# Patient Record
Sex: FEMALE | Race: ASIAN | NOT HISPANIC OR LATINO | Employment: FULL TIME | URBAN - METROPOLITAN AREA
[De-identification: names, ages, dates, MRNs, and addresses within clinical notes are randomized per-mention and may not be internally consistent; named-entity substitution may affect disease eponyms.]

---

## 2024-05-29 ENCOUNTER — OCCUPATIONAL HEALTH (OUTPATIENT)
Dept: URGENT CARE | Facility: CLINIC | Age: 69
End: 2024-05-29
Payer: COMMERCIAL

## 2024-05-29 ENCOUNTER — OFFICE VISIT (OUTPATIENT)
Dept: URGENT CARE | Facility: CLINIC | Age: 69
End: 2024-05-29
Payer: COMMERCIAL

## 2024-05-29 VITALS
DIASTOLIC BLOOD PRESSURE: 74 MMHG | HEIGHT: 64 IN | SYSTOLIC BLOOD PRESSURE: 116 MMHG | WEIGHT: 110 LBS | RESPIRATION RATE: 18 BRPM | OXYGEN SATURATION: 97 % | HEART RATE: 71 BPM | BODY MASS INDEX: 18.78 KG/M2

## 2024-05-29 DIAGNOSIS — Z02.83 ENCOUNTER FOR DRUG SCREENING: Primary | ICD-10-CM

## 2024-05-29 DIAGNOSIS — Z02.6 ENCOUNTER RELATED TO WORKER'S COMPENSATION CLAIM: ICD-10-CM

## 2024-05-29 DIAGNOSIS — S50.02XA CONTUSION OF LEFT ELBOW, INITIAL ENCOUNTER: Primary | ICD-10-CM

## 2024-05-29 DIAGNOSIS — S40.012A CONTUSION OF LEFT SHOULDER, INITIAL ENCOUNTER: ICD-10-CM

## 2024-05-29 LAB
CTP QC/QA: YES
POC 10 PANEL DRUG SCREEN: NEGATIVE

## 2024-05-29 PROCEDURE — 73030 X-RAY EXAM OF SHOULDER: CPT | Mod: LT,S$GLB,, | Performed by: RADIOLOGY

## 2024-05-29 PROCEDURE — 99204 OFFICE O/P NEW MOD 45 MIN: CPT | Mod: S$GLB,,, | Performed by: PHYSICIAN ASSISTANT

## 2024-05-29 PROCEDURE — 73070 X-RAY EXAM OF ELBOW: CPT | Mod: LT,S$GLB,, | Performed by: RADIOLOGY

## 2024-05-29 PROCEDURE — 80305 DRUG TEST PRSMV DIR OPT OBS: CPT | Mod: S$GLB,,, | Performed by: PHYSICIAN ASSISTANT

## 2024-05-29 RX ORDER — AMLODIPINE BESYLATE 5 MG/1
1 TABLET ORAL EVERY MORNING
COMMUNITY
Start: 2023-08-04 | End: 2024-06-07 | Stop reason: SDUPTHER

## 2024-05-29 RX ORDER — METFORMIN HYDROCHLORIDE 1000 MG/1
1000 TABLET ORAL
COMMUNITY
Start: 2024-04-22 | End: 2024-06-07 | Stop reason: SDUPTHER

## 2024-05-29 RX ORDER — FENOFIBRATE 67 MG/1
CAPSULE ORAL
COMMUNITY
Start: 2023-08-04 | End: 2024-06-07 | Stop reason: SDUPTHER

## 2024-05-29 RX ORDER — DAPAGLIFLOZIN 10 MG/1
10 TABLET, FILM COATED ORAL DAILY
COMMUNITY
Start: 2023-08-04 | End: 2024-06-07 | Stop reason: SDUPTHER

## 2024-05-29 RX ORDER — PAROXETINE 10 MG/1
10 TABLET, FILM COATED ORAL
COMMUNITY
Start: 2023-08-04 | End: 2024-06-07 | Stop reason: SDUPTHER

## 2024-05-29 RX ORDER — ATORVASTATIN CALCIUM 10 MG/1
1 TABLET, FILM COATED ORAL NIGHTLY
COMMUNITY
Start: 2023-08-04 | End: 2024-06-07 | Stop reason: SDUPTHER

## 2024-05-29 RX ORDER — LOSARTAN POTASSIUM 50 MG/1
1 TABLET ORAL DAILY
COMMUNITY
Start: 2023-08-04 | End: 2024-06-07 | Stop reason: SDUPTHER

## 2024-05-29 NOTE — PROGRESS NOTES
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    Chief Complaint: Arm Injury     Chandrakant presents for evaluation of left arm injuries, DOI 5/28/24.  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  She reports she was at her desk yesterday and was reaching for something and the chair tipped, causing her to fall on her left arm.  She is having left elbow and left shoulder pain.  She also has swelling and bruising of the elbow.  The pain is worse with lifting her arm & bending the elbow.  She has taken tylenol & ibuprofen with relief.    See MA note below.  Patient's place of employment - [employer]  Patient's job title - MD  Date of injury - 5/28/2024  Body part injured including left or right - LT ARM  Injury Mechanism -   What they were doing when they got hurt - Sitting in chair reaching for something and the chair tipped over   What they did immediately after - took Tylenol and continued to work .  Pain scale right now - 7/10    KM      Constitution: Negative.   HENT: Negative.     Neck: neck negative.   Cardiovascular: Negative.    Respiratory: Negative.     Musculoskeletal:  Positive for pain, joint pain, joint swelling, abnormal ROM of joint and muscle ache. Negative for trauma.   Skin: Negative.  Positive for bruising.   Neurological:  Negative for numbness and tingling.     Objective:     Physical Exam  Vitals and nursing note reviewed.   Constitutional:       General: She is not in acute distress.     Appearance: Normal appearance. She is not ill-appearing.   HENT:      Head: Normocephalic and atraumatic.      Right Ear: Tympanic membrane, ear canal and external ear normal. There is no impacted cerumen.      Left Ear: Tympanic membrane, ear canal and external ear normal. There is no impacted cerumen.      Nose: Nose normal. No congestion or rhinorrhea.      Mouth/Throat:      Mouth: Mucous membranes are moist.      Pharynx: No oropharyngeal exudate or posterior oropharyngeal erythema.   Eyes:       Extraocular Movements: Extraocular movements intact.      Conjunctiva/sclera: Conjunctivae normal.      Pupils: Pupils are equal, round, and reactive to light.   Cardiovascular:      Rate and Rhythm: Normal rate and regular rhythm.      Pulses: Normal pulses.      Heart sounds: Normal heart sounds.   Pulmonary:      Effort: Pulmonary effort is normal.      Breath sounds: Normal breath sounds.   Abdominal:      General: Bowel sounds are normal.      Palpations: Abdomen is soft.   Musculoskeletal:      Left shoulder: Tenderness and bony tenderness present. No swelling, deformity, effusion, laceration or crepitus. Normal range of motion. Normal strength. Normal pulse.      Left elbow: Swelling present. No deformity, effusion or lacerations. Decreased range of motion. Tenderness present in medial epicondyle and lateral epicondyle. No radial head or olecranon process tenderness.        Arms:       Cervical back: Normal range of motion.      Comments: L shoulder with anterior shoulder TTP.  There is FROM of the shoulder.  Pain with abduction and overhead reaching.  Ulices's test neg, drop arm test neg.  No weakness with IR or ER, but does cause pain in the elbow.    L elbow with mild swelling & ecchymosis to lateral elbow.  There is TTP lateral epicondyle > medial epicondyle.    Full flexion, slight decrease in extension.     Skin:     General: Skin is warm.      Capillary Refill: Capillary refill takes less than 2 seconds.   Neurological:      General: No focal deficit present.      Mental Status: She is alert and oriented to person, place, and time.   Psychiatric:         Mood and Affect: Mood normal.         Behavior: Behavior normal.         Thought Content: Thought content normal.         Judgment: Judgment normal.        XR L elbow - no acute fracture or abnormality, radiology read pending.    XR L shoulder - no acute fracture or abnormality, radiology read pending.    Assessment:      1. Contusion of left elbow,  initial encounter    2. Contusion of left shoulder, initial encounter    3. Encounter related to worker's compensation claim      Plan:     There were no acute findings on my interpretation of the x-rays and I discussed with this with the patient.  The radiologist's interpretation was pending at the completion of the visit and I informed the patient that they will be notified if the interpretation differs. Patient would prefer OTC tylenol & ibuprofen.  Advised ice, then heat & gentle ROM.  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  She will follow up in 1 week.     Patient Instructions: Apply ice 24-48 hours then apply heat/warm soaks   Restrictions: Regular Duty  Follow up in about 1 week (around 6/5/2024).

## 2024-05-29 NOTE — LETTER
[facility]  [address] [address]  Phone: [phone]  Fax: [phone]  [employer] [facility]: [email or web address]    Pt Name: Ciera Stallings MD  Injury Date: 05/28/2024   Employee ID: [id number] Date of Treatment: 05/29/2024   Company: [employer]      Appointment Time:  Arrived: 10:40 AM    Provider: Rachna Rueda PA-C Time Out:12:46 PM      Office Treatment:   1. Contusion of left elbow, initial encounter    2. Contusion of left shoulder, initial encounter    3. Encounter related to worker's compensation claim          Patient Instructions: Apply ice 24-48 hours then apply heat/warm soaks      Restrictions: Regular Duty     Return Appointment: 6/5/2024 at 9:00 AM [facility]

## 2024-06-05 ENCOUNTER — OFFICE VISIT (OUTPATIENT)
Dept: URGENT CARE | Facility: CLINIC | Age: 69
End: 2024-06-05
Payer: COMMERCIAL

## 2024-06-05 VITALS
WEIGHT: 110 LBS | RESPIRATION RATE: 17 BRPM | HEART RATE: 74 BPM | DIASTOLIC BLOOD PRESSURE: 83 MMHG | HEIGHT: 64 IN | OXYGEN SATURATION: 98 % | SYSTOLIC BLOOD PRESSURE: 123 MMHG | BODY MASS INDEX: 18.78 KG/M2

## 2024-06-05 DIAGNOSIS — Z02.6 ENCOUNTER RELATED TO WORKER'S COMPENSATION CLAIM: ICD-10-CM

## 2024-06-05 DIAGNOSIS — S40.012D CONTUSION OF LEFT SHOULDER, SUBSEQUENT ENCOUNTER: ICD-10-CM

## 2024-06-05 DIAGNOSIS — S50.02XD CONTUSION OF LEFT ELBOW, SUBSEQUENT ENCOUNTER: Primary | ICD-10-CM

## 2024-06-05 PROCEDURE — 99213 OFFICE O/P EST LOW 20 MIN: CPT | Mod: S$GLB,,, | Performed by: PHYSICIAN ASSISTANT

## 2024-06-05 NOTE — PROGRESS NOTES
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    Chief Complaint: Arm Pain     Chandrakant presents for follow up of left arm injuries, DOI 5/28/24.  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  Her ROM is better.  She is still sore to the touch at the elbow and having pain with lifting & ROM of the shoulder.  She denies any radiating pain or paresthesias in the arm.  She is not taking any medication at this time, but is doing massage at night.    See MA note below.  Patient's place of employment - [employer]  Patient's job title - MD  Date of Injury - 05/28/2024  Body part injured - Left Arm  Current work status per last visit - Regular   Improved, same, or worse - Same, but improving gradually   Pain Scale right now (1-10) -  5/10  SB.     Pt states that she has been having a radiating pain that starts at the neck and radiates down Left arm to the elbow.         Constitution: Negative.   HENT: Negative.     Neck: Negative for neck pain and neck stiffness.   Cardiovascular: Negative.    Respiratory: Negative.     Musculoskeletal:  Positive for pain, joint pain, joint swelling, abnormal ROM of joint and muscle ache. Negative for trauma.   Skin: Negative.  Negative for bruising.   Neurological:  Negative for numbness and tingling.     Objective:     Physical Exam  Vitals and nursing note reviewed.   Constitutional:       General: She is not in acute distress.     Appearance: Normal appearance. She is not ill-appearing.   HENT:      Head: Normocephalic and atraumatic.      Right Ear: Tympanic membrane, ear canal and external ear normal. There is no impacted cerumen.      Left Ear: Tympanic membrane, ear canal and external ear normal. There is no impacted cerumen.      Nose: Nose normal. No congestion or rhinorrhea.      Mouth/Throat:      Mouth: Mucous membranes are moist.      Pharynx: No oropharyngeal exudate or posterior oropharyngeal erythema.   Eyes:       Extraocular Movements: Extraocular movements intact.      Conjunctiva/sclera: Conjunctivae normal.      Pupils: Pupils are equal, round, and reactive to light.   Cardiovascular:      Rate and Rhythm: Normal rate and regular rhythm.      Pulses: Normal pulses.      Heart sounds: Normal heart sounds.   Pulmonary:      Effort: Pulmonary effort is normal.      Breath sounds: Normal breath sounds.   Abdominal:      General: Bowel sounds are normal.      Palpations: Abdomen is soft.   Musculoskeletal:      Left shoulder: Tenderness and bony tenderness present. No swelling, deformity, effusion, laceration or crepitus. Normal range of motion. Normal strength. Normal pulse.      Left elbow: No swelling, deformity, effusion or lacerations. Normal range of motion. Tenderness present in medial epicondyle and lateral epicondyle. No radial head or olecranon process tenderness.        Arms:       Cervical back: Normal range of motion.      Comments: L shoulder with anterior shoulder TTP.  There is FROM of the shoulder.  ROM is less painful today.  Ulices's test neg, drop arm test neg.    No weakness with IR or ER.    L elbow with resolved swelling & ecchymosis.   There is TTP lateral epicondyle > medial epicondyle.    Full flexion & extension today.     Skin:     General: Skin is warm.      Capillary Refill: Capillary refill takes less than 2 seconds.   Neurological:      General: No focal deficit present.      Mental Status: She is alert and oriented to person, place, and time.   Psychiatric:         Mood and Affect: Mood normal.         Behavior: Behavior normal.         Thought Content: Thought content normal.         Judgment: Judgment normal.        Assessment:      1. Contusion of left elbow, subsequent encounter    2. Contusion of left shoulder, subsequent encounter    3. 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  She is not interested in any prescription  medications like muscle relaxers at this time.  She will continue at home ROM/massage.  Follow up in 3 weeks and anticipate discharge at that time.     Patient Instructions: Attention not to aggravate affected area   Restrictions: Regular Duty  Follow up in about 20 days (around 6/25/2024).

## 2024-06-05 NOTE — LETTER
[facility]  [address] [address]  Phone: [phone]  Fax: [phone]  [employer] [facility]: [id number]    Pt Name: Ciera Stallings MD  Injury Date: 05/28/2024   Employee ID: [id number] Date of Treatment: 06/05/2024   Company: [employer]      Appointment Time: 09:00 AM Arrived: 8:38 AM    Provider: Rachna Rueda PA-C Time Out:9:30 AM      Office Treatment:   1. Contusion of left elbow, subsequent encounter    2. Contusion of left shoulder, subsequent encounter    3. Encounter related to worker's compensation claim          Patient Instructions: Attention not to aggravate affected area      Restrictions: Regular Duty     Return Appointment: 6/25/2024 at 8:30 AM [facility]

## 2024-06-06 PROBLEM — E03.8 SUBCLINICAL HYPOTHYROIDISM: Status: ACTIVE | Noted: 2023-01-16

## 2024-06-06 PROBLEM — H52.4 MYOPIA WITH ASTIGMATISM AND PRESBYOPIA, BILATERAL: Status: ACTIVE | Noted: 2021-03-25

## 2024-06-06 PROBLEM — I10 BENIGN ESSENTIAL HYPERTENSION: Status: ACTIVE | Noted: 2023-01-16

## 2024-06-06 PROBLEM — H40.013 OPEN ANGLE WITH BORDERLINE FINDINGS AND LOW GLAUCOMA RISK IN BOTH EYES: Status: ACTIVE | Noted: 2021-03-25

## 2024-06-06 PROBLEM — F41.1 GAD (GENERALIZED ANXIETY DISORDER): Status: ACTIVE | Noted: 2023-01-16

## 2024-06-06 PROBLEM — E11.69 HYPERLIPIDEMIA ASSOCIATED WITH TYPE 2 DIABETES MELLITUS: Status: ACTIVE | Noted: 2024-06-06

## 2024-06-06 PROBLEM — H04.123 DRY EYES, BILATERAL: Status: ACTIVE | Noted: 2021-03-25

## 2024-06-06 PROBLEM — E78.5 HYPERLIPIDEMIA ASSOCIATED WITH TYPE 2 DIABETES MELLITUS: Status: ACTIVE | Noted: 2024-06-06

## 2024-06-06 PROBLEM — H52.203 MYOPIA WITH ASTIGMATISM AND PRESBYOPIA, BILATERAL: Status: ACTIVE | Noted: 2021-03-25

## 2024-06-06 PROBLEM — H52.13 MYOPIA WITH ASTIGMATISM AND PRESBYOPIA, BILATERAL: Status: ACTIVE | Noted: 2021-03-25

## 2024-06-06 PROBLEM — H25.13 NUCLEAR SCLEROSIS, BILATERAL: Status: ACTIVE | Noted: 2021-03-25

## 2024-06-06 PROBLEM — H11.152 PINGUECULA OF LEFT EYE: Status: ACTIVE | Noted: 2021-03-25

## 2024-06-06 PROBLEM — F41.9 ANXIETY: Status: ACTIVE | Noted: 2023-01-16

## 2024-06-06 RX ORDER — LORATADINE 10 MG/1
10 TABLET ORAL
COMMUNITY
End: 2024-06-07

## 2024-06-06 RX ORDER — METOPROLOL SUCCINATE 100 MG/1
1 TABLET, EXTENDED RELEASE ORAL DAILY
COMMUNITY
Start: 2023-08-04 | End: 2024-06-07 | Stop reason: SDUPTHER

## 2024-06-06 RX ORDER — DEXTROSE 4 G
TABLET,CHEWABLE ORAL
COMMUNITY
Start: 2024-04-22

## 2024-06-06 NOTE — ASSESSMENT & PLAN NOTE
Lipid panel cholesterol 156, HDL 60, LDL 70, triglycerides 150  Continue current regimen:  - Atorvastatin 10 mg  - Fenofibrate 67 mg

## 2024-06-06 NOTE — PROGRESS NOTES
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 Patient denies any acute concerns today.      PAST MEDICAL HISTORY:  Past Medical History:   Diagnosis Date    Benign essential hypertension 01/16/2023    MARQUITA (generalized anxiety disorder) 01/16/2023    Hyperlipidemia associated with type 2 diabetes mellitus 06/06/2024    Subclinical hypothyroidism 01/16/2023    Type 2 diabetes mellitus without retinopathy 12/29/2013         MEDICATIONS:    Current Outpatient Medications:     blood-glucose meter Misc, use as directed, Disp: , Rfl:     amLODIPine (NORVASC) 5 MG tablet, Take 1 tablet (5 mg total) by mouth every morning., Disp: 90 tablet, Rfl: 3    atorvastatin (LIPITOR) 10 MG tablet, Take 1 tablet (10 mg total) by mouth every evening., Disp: 90 tablet, Rfl: 3    dapagliflozin propanediol (FARXIGA) 10 mg tablet, Take 1 tablet (10 mg total) by mouth once daily., Disp: 90 tablet, Rfl: 3    fenofibrate micronized (LOFIBRA) 67 MG capsule, Take 1 capsule (67 mg total) by mouth before breakfast., Disp: 90 capsule, Rfl: 3    losartan (COZAAR) 50 MG tablet, Take 1 tablet (50 mg total) by mouth once daily., Disp: 90 tablet, Rfl: 3    metFORMIN (GLUCOPHAGE) 1000 MG tablet, Take 1 tablet (1,000 mg total) by mouth 2 (two) times daily with meals., Disp: 180 tablet, Rfl: 3    metoprolol succinate (TOPROL-XL) 100 MG 24 hr tablet, Take 1 tablet (100 mg total) by mouth once daily., Disp: 90 tablet, Rfl: 3    paroxetine (PAXIL) 10 MG tablet, Take 1 tablet (10 mg total) by mouth once daily., Disp: 90 tablet, Rfl: 3        PAST SURGICAL HISTORY:  Past Surgical History:   Procedure Laterality Date    APPENDECTOMY N/A     ELBOW FRACTURE SURGERY Right     HIP FRACTURE SURGERY Left   "      SOCIAL HISTORY:  Social History     Tobacco Use    Smoking status: Never    Smokeless tobacco: Never   Substance Use Topics    Alcohol use: Never    Drug use: Never       ALLERGIES:  Review of patient's allergies indicates:  No Known Allergies      FAMILY HISTORY:  Family History   Problem Relation Name Age of Onset    Diabetes Mother jorge l     Hypertension Mother jorge l     Cancer Father WOLF Montiel     Diabetes Sister majo,     Hearing loss Sister majo,     Diabetes Brother jorge luis, alberto          HEALTH MAINTENANCE:     IMMUNIZATIONS:                                                              Influenza: not indicated  COVID: yes, up to date  RSV:  defer  Tdap: none on file; requested  HPV: not indicated  Shingles:  completed  Pneumonia: none on file; requested    SCREENINGS        Breast cancer: prior mammograms normal; declines further screening given age  Colon cancer: no prior screening; declines further screening given age  Lung cancer: not indicated  HIV: not indicated  Hepatitis C:  defer  STI: not indicated  Diabetes: ordered  Lipids: ordered  AAA: not indicated  Bone density: none on file; declined  Anxiety and depression: managed    LIFESTYLE         Exercise: walking occasionally, otherwise no  Diet: average  Substance use: as above  Employment: Radiologist at [employer]  Family & living situation: Lives in [place]    INTERVENTIONS        Statin: yes  Aspirin: no        PHYSICAL EXAM:    /70 (BP Location: Left arm, Patient Position: Sitting, BP Method: Medium (Manual))   Pulse 76   Ht 5' 4" (1.626 m)   Wt 50.3 kg (110 lb 14.3 oz)   SpO2 99%   BMI 19.03 kg/m²     Physical Exam  Vitals and nursing note reviewed.   Constitutional:       General: She is not in acute distress.     Appearance: Normal appearance. She is not ill-appearing, toxic-appearing or diaphoretic.   HENT:      Head: Normocephalic and atraumatic.      Nose: Nose normal.   Eyes:      Extraocular Movements: " Extraocular movements intact.      Conjunctiva/sclera: Conjunctivae normal.      Pupils: Pupils are equal, round, and reactive to light.   Cardiovascular:      Rate and Rhythm: Normal rate and regular rhythm.      Heart sounds: Normal heart sounds. No murmur heard.  Pulmonary:      Effort: Pulmonary effort is normal. No respiratory distress.      Breath sounds: Normal breath sounds. No wheezing.   Musculoskeletal:         General: No deformity. Normal range of motion.   Skin:     Findings: No lesion or rash.   Neurological:      General: No focal deficit present.      Mental Status: She is alert.      Motor: No weakness.      Gait: Gait normal.   Psychiatric:         Mood and Affect: Mood normal.         Behavior: Behavior normal.         Thought Content: Thought content normal.         Judgment: Judgment normal.              LAB REVIEW:      COMPREHENSIVE METABOLIC PANEL   Ref Range & Units 1 mo ago   Glucose Screen 70 - 99 mg/dL 129 High    Blood Urea Nitrogen 8 - 27 mg/dL 11   Creatinine 0.57 - 1.00 mg/dL 0.71   EGFR >59 mL/min/1.73 91   BUN/Creatinine Ratio 12 - 28 15   Sodium 134 - 144 mmol/L 142   Potassium 3.5 - 5.2 mmol/L 4.8   Chloride 96 - 106 mmol/L 102   Carbon Dioxide 20 - 29 mmol/L 24   Calcium 8.7 - 10.3 mg/dL 10.0   Protein Total 6.0 - 8.5 g/dL 7.2   Albumin Level 3.9 - 4.9 g/dL 4.6   Globulin 1.5 - 4.5 g/dL 2.6   Albumin/Globulin Ratio 1.2 - 2.2 1.8   Bilirubin Total 0.0 - 1.2 mg/dL 0.3   Alkaline Phosphatase 44 - 121 IU/L 71   SGOT (AST) 0 - 40 IU/L 23   SGPT (ALT) 0 - 32 IU/L 19       Lab Results   Component Value Date    TSH 5.090 (H) 04/19/2024       Lab Results   Component Value Date    HGBA1C 6.2 (H) 04/19/2024       Cholesterol   Date Value Ref Range Status   04/19/2024 156 100 - 199 mg/dL Final     HDL   Date Value Ref Range Status   04/19/2024 60 >39 mg/dL Final     LDL Calculated   Date Value Ref Range Status   04/19/2024 70 0 - 99 mg/dL Final     Triglycerides   Date Value Ref Range  Status   04/19/2024 150 (H) 0 - 149 mg/dL Final             ASSESSMENT & PLAN:    1. Annual physical exam  2. Health maintenance examination  Health screenings and immunizations due as below  History, physical exam findings, imaging results, and lab results are all acceptable with exception of what is detailed below      3. Type 2 diabetes mellitus without retinopathy  Assessment & Plan:  Very well controlled. A1c 6.2%.  No associated complications.  Current regimen:   - Farxiga 10 mg daily  - Metformin 1000 mg BID  - Atorvastatin for cardiac protection  - Losartan for renal protection   Orders:  -     metFORMIN (GLUCOPHAGE) 1000 MG tablet; Take 1 tablet (1,000 mg total) by mouth 2 (two) times daily with meals.  Dispense: 180 tablet; Refill: 3  -     dapagliflozin propanediol (FARXIGA) 10 mg tablet; Take 1 tablet (10 mg total) by mouth once daily.  Dispense: 90 tablet; Refill: 3      4. Hyperlipidemia associated with type 2 diabetes mellitus  Assessment & Plan:  Lipid panel cholesterol 156, HDL 60, LDL 70, triglycerides 150  Continue current regimen:  - Atorvastatin 10 mg  - Fenofibrate 67 mg  Orders:  -     atorvastatin (LIPITOR) 10 MG tablet; Take 1 tablet (10 mg total) by mouth every evening.  Dispense: 90 tablet; Refill: 3  -     fenofibrate micronized (LOFIBRA) 67 MG capsule; Take 1 capsule (67 mg total) by mouth before breakfast.  Dispense: 90 capsule; Refill: 3      5. Benign essential hypertension  Assessment & Plan:  /70. Controlled. Asymptomatic.   Continue current regimen:  - Amlodipine 5 mg  - Losartan 50 mg  - Metoprolol suc 100 mg  Orders:  -     metoprolol succinate (TOPROL-XL) 100 MG 24 hr tablet; Take 1 tablet (100 mg total) by mouth once daily.  Dispense: 90 tablet; Refill: 3  -     losartan (COZAAR) 50 MG tablet; Take 1 tablet (50 mg total) by mouth once daily.  Dispense: 90 tablet; Refill: 3  -     amLODIPine (NORVASC) 5 MG tablet; Take 1 tablet (5 mg total) by mouth every morning.   Dispense: 90 tablet; Refill: 3      6. MARQUITA (generalized anxiety disorder)  Assessment & Plan:  Stable. Well controlled.   Continue current regimen:   - Paroxetine 10 mg daily  Orders:  -     paroxetine (PAXIL) 10 MG tablet; Take 1 tablet (10 mg total) by mouth once daily.  Dispense: 90 tablet; Refill: 3      7. Subclinical hypothyroidism  Assessment & Plan:  TSH 5.090; T4 WNL  No treatment at this time      8. Dry eyes, bilateral  9. Nuclear sclerosis, bilateral  10. Myopia with astigmatism and presbyopia, bilateral  11. Open angle with borderline findings and low glaucoma risk in both eyes  12. Pinguecula of left eye  Assessment & Plan:  No acute issues.   F/U with Ophthalmology as needed.      13. Screening declined by patient  Assessment & Plan:  - Breast cancer screening: Mammogram historically normal. Declines routine screening given age; will continue self exams.  - Colon cancer screening: No prior screening. Not interested in colonoscopy or cologuard at this point. Patient is low risk. Will self monitor for s/s.   - Osteoporosis screening: No prior screening. Patient would not be interested in pharmacotherapy so deferring screening. Consider vitamin D & calcium supplementation; weight bearing exercise.       14. Need for vaccination  -     VFC-Tdap (ADACEL) vaccine 0.5 mL  -     pneumoc 20-nestor conj-dip cr(PF) (PREVNAR-20 (PF)) injection Syrg 0.5 mL                  Luisa Zarate MD  [facility]  06/07/2024

## 2024-06-06 NOTE — ASSESSMENT & PLAN NOTE
Very well controlled. A1c 6.2%.  No associated complications.  Current regimen:   - Farxiga 10 mg daily  - Metformin 1000 mg BID  - Atorvastatin for cardiac protection  - Losartan for renal protection

## 2024-06-06 NOTE — ASSESSMENT & PLAN NOTE
/70. Controlled. Asymptomatic.   Continue current regimen:  - Amlodipine 5 mg  - Losartan 50 mg  - Metoprolol suc 100 mg

## 2024-06-07 ENCOUNTER — CLINICAL SUPPORT (OUTPATIENT)
Dept: INTERNAL MEDICINE | Facility: CLINIC | Age: 69
End: 2024-06-07
Payer: COMMERCIAL

## 2024-06-07 ENCOUNTER — OFFICE VISIT (OUTPATIENT)
Dept: INTERNAL MEDICINE | Facility: CLINIC | Age: 69
End: 2024-06-07
Payer: COMMERCIAL

## 2024-06-07 VITALS
BODY MASS INDEX: 18.93 KG/M2 | DIASTOLIC BLOOD PRESSURE: 70 MMHG | HEART RATE: 76 BPM | SYSTOLIC BLOOD PRESSURE: 118 MMHG | HEIGHT: 64 IN | WEIGHT: 110.88 LBS | OXYGEN SATURATION: 99 %

## 2024-06-07 DIAGNOSIS — E11.69 HYPERLIPIDEMIA ASSOCIATED WITH TYPE 2 DIABETES MELLITUS: ICD-10-CM

## 2024-06-07 DIAGNOSIS — Z00.00 ANNUAL PHYSICAL EXAM: Primary | ICD-10-CM

## 2024-06-07 DIAGNOSIS — E11.9 TYPE 2 DIABETES MELLITUS WITHOUT RETINOPATHY: ICD-10-CM

## 2024-06-07 DIAGNOSIS — H52.13 MYOPIA WITH ASTIGMATISM AND PRESBYOPIA, BILATERAL: ICD-10-CM

## 2024-06-07 DIAGNOSIS — Z53.20 SCREENING DECLINED BY PATIENT: ICD-10-CM

## 2024-06-07 DIAGNOSIS — H04.123 DRY EYES, BILATERAL: ICD-10-CM

## 2024-06-07 DIAGNOSIS — H11.152 PINGUECULA OF LEFT EYE: ICD-10-CM

## 2024-06-07 DIAGNOSIS — E03.8 SUBCLINICAL HYPOTHYROIDISM: ICD-10-CM

## 2024-06-07 DIAGNOSIS — H52.203 MYOPIA WITH ASTIGMATISM AND PRESBYOPIA, BILATERAL: ICD-10-CM

## 2024-06-07 DIAGNOSIS — Z23 NEED FOR VACCINATION: ICD-10-CM

## 2024-06-07 DIAGNOSIS — F41.1 GAD (GENERALIZED ANXIETY DISORDER): ICD-10-CM

## 2024-06-07 DIAGNOSIS — E78.5 HYPERLIPIDEMIA ASSOCIATED WITH TYPE 2 DIABETES MELLITUS: ICD-10-CM

## 2024-06-07 DIAGNOSIS — H52.4 MYOPIA WITH ASTIGMATISM AND PRESBYOPIA, BILATERAL: ICD-10-CM

## 2024-06-07 DIAGNOSIS — I10 BENIGN ESSENTIAL HYPERTENSION: ICD-10-CM

## 2024-06-07 DIAGNOSIS — H40.013 OPEN ANGLE WITH BORDERLINE FINDINGS AND LOW GLAUCOMA RISK IN BOTH EYES: ICD-10-CM

## 2024-06-07 DIAGNOSIS — Z00.00 HEALTH MAINTENANCE EXAMINATION: ICD-10-CM

## 2024-06-07 DIAGNOSIS — H25.13 NUCLEAR SCLEROSIS, BILATERAL: ICD-10-CM

## 2024-06-07 PROCEDURE — 99999 PR PBB SHADOW E&M-EST. PATIENT-LVL III: CPT | Mod: PBBFAC,,, | Performed by: STUDENT IN AN ORGANIZED HEALTH CARE EDUCATION/TRAINING PROGRAM

## 2024-06-07 PROCEDURE — 1101F PT FALLS ASSESS-DOCD LE1/YR: CPT | Mod: CPTII,S$GLB,, | Performed by: STUDENT IN AN ORGANIZED HEALTH CARE EDUCATION/TRAINING PROGRAM

## 2024-06-07 PROCEDURE — 90715 TDAP VACCINE 7 YRS/> IM: CPT | Mod: S$GLB,,, | Performed by: STUDENT IN AN ORGANIZED HEALTH CARE EDUCATION/TRAINING PROGRAM

## 2024-06-07 PROCEDURE — 3008F BODY MASS INDEX DOCD: CPT | Mod: CPTII,S$GLB,, | Performed by: STUDENT IN AN ORGANIZED HEALTH CARE EDUCATION/TRAINING PROGRAM

## 2024-06-07 PROCEDURE — 99387 INIT PM E/M NEW PAT 65+ YRS: CPT | Mod: S$GLB,,, | Performed by: STUDENT IN AN ORGANIZED HEALTH CARE EDUCATION/TRAINING PROGRAM

## 2024-06-07 PROCEDURE — 90677 PCV20 VACCINE IM: CPT | Mod: S$GLB,,, | Performed by: STUDENT IN AN ORGANIZED HEALTH CARE EDUCATION/TRAINING PROGRAM

## 2024-06-07 PROCEDURE — 99999 PR PBB SHADOW E&M-EST. PATIENT-LVL I: CPT | Mod: PBBFAC,,,

## 2024-06-07 PROCEDURE — 3044F HG A1C LEVEL LT 7.0%: CPT | Mod: CPTII,S$GLB,, | Performed by: STUDENT IN AN ORGANIZED HEALTH CARE EDUCATION/TRAINING PROGRAM

## 2024-06-07 PROCEDURE — 3074F SYST BP LT 130 MM HG: CPT | Mod: CPTII,S$GLB,, | Performed by: STUDENT IN AN ORGANIZED HEALTH CARE EDUCATION/TRAINING PROGRAM

## 2024-06-07 PROCEDURE — 3288F FALL RISK ASSESSMENT DOCD: CPT | Mod: CPTII,S$GLB,, | Performed by: STUDENT IN AN ORGANIZED HEALTH CARE EDUCATION/TRAINING PROGRAM

## 2024-06-07 PROCEDURE — 3078F DIAST BP <80 MM HG: CPT | Mod: CPTII,S$GLB,, | Performed by: STUDENT IN AN ORGANIZED HEALTH CARE EDUCATION/TRAINING PROGRAM

## 2024-06-07 PROCEDURE — 1159F MED LIST DOCD IN RCRD: CPT | Mod: CPTII,S$GLB,, | Performed by: STUDENT IN AN ORGANIZED HEALTH CARE EDUCATION/TRAINING PROGRAM

## 2024-06-07 PROCEDURE — 4010F ACE/ARB THERAPY RXD/TAKEN: CPT | Mod: CPTII,S$GLB,, | Performed by: STUDENT IN AN ORGANIZED HEALTH CARE EDUCATION/TRAINING PROGRAM

## 2024-06-07 PROCEDURE — 90471 IMMUNIZATION ADMIN: CPT | Mod: S$GLB,,, | Performed by: STUDENT IN AN ORGANIZED HEALTH CARE EDUCATION/TRAINING PROGRAM

## 2024-06-07 PROCEDURE — 90472 IMMUNIZATION ADMIN EACH ADD: CPT | Mod: S$GLB,,, | Performed by: STUDENT IN AN ORGANIZED HEALTH CARE EDUCATION/TRAINING PROGRAM

## 2024-06-07 PROCEDURE — 1160F RVW MEDS BY RX/DR IN RCRD: CPT | Mod: CPTII,S$GLB,, | Performed by: STUDENT IN AN ORGANIZED HEALTH CARE EDUCATION/TRAINING PROGRAM

## 2024-06-07 RX ORDER — AMLODIPINE BESYLATE 5 MG/1
5 TABLET ORAL EVERY MORNING
Qty: 90 TABLET | Refills: 3 | Status: SHIPPED | OUTPATIENT
Start: 2024-06-07 | End: 2025-06-07

## 2024-06-07 RX ORDER — METFORMIN HYDROCHLORIDE 1000 MG/1
1000 TABLET ORAL 2 TIMES DAILY WITH MEALS
Qty: 180 TABLET | Refills: 3 | Status: SHIPPED | OUTPATIENT
Start: 2024-06-07 | End: 2025-06-02

## 2024-06-07 RX ORDER — DAPAGLIFLOZIN 10 MG/1
10 TABLET, FILM COATED ORAL DAILY
Qty: 90 TABLET | Refills: 3 | Status: SHIPPED | OUTPATIENT
Start: 2024-06-07 | End: 2025-06-02

## 2024-06-07 RX ORDER — ATORVASTATIN CALCIUM 10 MG/1
10 TABLET, FILM COATED ORAL NIGHTLY
Qty: 90 TABLET | Refills: 3 | Status: SHIPPED | OUTPATIENT
Start: 2024-06-07 | End: 2025-06-07

## 2024-06-07 RX ORDER — LOSARTAN POTASSIUM 50 MG/1
50 TABLET ORAL DAILY
Qty: 90 TABLET | Refills: 3 | Status: SHIPPED | OUTPATIENT
Start: 2024-06-07 | End: 2025-06-02

## 2024-06-07 RX ORDER — FENOFIBRATE 67 MG/1
67 CAPSULE ORAL
Qty: 90 CAPSULE | Refills: 3 | Status: SHIPPED | OUTPATIENT
Start: 2024-06-07 | End: 2025-06-02

## 2024-06-07 RX ORDER — PAROXETINE 10 MG/1
10 TABLET, FILM COATED ORAL DAILY
Qty: 90 TABLET | Refills: 3 | Status: SHIPPED | OUTPATIENT
Start: 2024-06-07 | End: 2025-06-02

## 2024-06-07 RX ORDER — METOPROLOL SUCCINATE 100 MG/1
100 TABLET, EXTENDED RELEASE ORAL DAILY
Qty: 90 TABLET | Refills: 3 | Status: SHIPPED | OUTPATIENT
Start: 2024-06-07 | End: 2025-06-02

## 2024-06-07 NOTE — LETTER
June 7, 2024    Ciera Stallings MD  [address] [id number]  [facility] [address]             [facility]  [address]  [facility] [address]  Phone: [phone]  Fax: [phone] Dear Dr. Stallings:      It was a pleasure seeing you in clinic for your Executive Health exam on 6/7/2024. Enclosed is a copy of the clinic note detailing the history, physical exam findings, laboratory studies, and recommendations at that time. Thank you for allowing me to participate in your medical care.             If you have any questions or concerns, please don't hesitate to call.    Sincerely,        Luisa Zarate MD

## 2024-06-07 NOTE — ASSESSMENT & PLAN NOTE
- Breast cancer screening: Mammogram historically normal. Declines routine screening given age; will continue self exams.  - Colon cancer screening: No prior screening. Not interested in colonoscopy or cologuard at this point. Patient is low risk. Will self monitor for s/s.   - Osteoporosis screening: No prior screening. Patient would not be interested in pharmacotherapy so deferring screening. Consider vitamin D & calcium supplementation; weight bearing exercise.

## 2024-06-19 DIAGNOSIS — Z78.0 MENOPAUSE: ICD-10-CM

## 2024-07-05 ENCOUNTER — OFFICE VISIT (OUTPATIENT)
Dept: URGENT CARE | Facility: CLINIC | Age: 69
End: 2024-07-05
Payer: COMMERCIAL

## 2024-07-05 VITALS
RESPIRATION RATE: 18 BRPM | SYSTOLIC BLOOD PRESSURE: 118 MMHG | HEART RATE: 81 BPM | HEIGHT: 64 IN | BODY MASS INDEX: 18.78 KG/M2 | DIASTOLIC BLOOD PRESSURE: 77 MMHG | OXYGEN SATURATION: 96 % | WEIGHT: 110 LBS | TEMPERATURE: 98 F

## 2024-07-05 DIAGNOSIS — Z02.6 ENCOUNTER RELATED TO WORKER'S COMPENSATION CLAIM: ICD-10-CM

## 2024-07-05 DIAGNOSIS — S50.02XD CONTUSION OF LEFT ELBOW, SUBSEQUENT ENCOUNTER: ICD-10-CM

## 2024-07-05 DIAGNOSIS — S16.1XXA STRAIN OF NECK MUSCLE, INITIAL ENCOUNTER: ICD-10-CM

## 2024-07-05 DIAGNOSIS — S40.012D CONTUSION OF LEFT SHOULDER, SUBSEQUENT ENCOUNTER: Primary | ICD-10-CM

## 2024-07-05 RX ORDER — CYCLOBENZAPRINE HCL 5 MG
5 TABLET ORAL 3 TIMES DAILY PRN
Qty: 30 TABLET | Refills: 0 | Status: SHIPPED | OUTPATIENT
Start: 2024-07-05 | End: 2024-07-15

## 2024-07-05 NOTE — LETTER
[facility]  [address] [address]  Phone: [phone]  Fax: [phone]  [facility] [employer] [facility]: [email or web address]    Pt Name: Ciera Stallings MD  Injury Date: 05/28/2024   Employee ID: [id number] Date of Treatment: 07/05/2024   Company: [employer]      Appointment Time: 08:15 AM Arrived: 8:20 am   Provider: Mariajose Mott MD Time Out: 9:00 am     Office Treatment:   1. Contusion of left shoulder, subsequent encounter    2. Encounter related to worker's compensation claim    3. Contusion of left elbow, subsequent encounter    4. Strain of neck muscle, initial encounter      Medications Ordered This Encounter   Medications    cyclobenzaprine (FLEXERIL) 5 MG tablet          Patient Instructions: Begin Physical Therapy, PT to be scheduled once authorized (Avoid driving while using muscle relaxer, instructions on initiating use are on AVS)          Restrictions: Regular Duty     Return Appointment: 7/12/2024 at 8:30 am    KW

## 2024-07-05 NOTE — PATIENT INSTRUCTIONS
Three day ramp up:  Day 1 take muscle relaxer 1.5-2 hrs before bedtime, check if you have increased drowsiness when you wake up the next day  Day 2, if no drowsiness in the morning, take the muscle relaxer in the late afternoon/early evening and again before bedtime.  Day 3,If no issues with drowsiness on Day 2, then can try taking muscle relaxer three times on Day 3 (morning, afternoon, night).  If you have a lot of drowsiness with using the muscle relaxer, only take before bedtime and make sure to avoid driving or operating heavy machinery within 8 hours of using the medication.

## 2024-07-05 NOTE — PROGRESS NOTES
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    Chief Complaint: Arm Injury (LT)    Patient's place of employment - [employer] [facility] [place]  Patient's job title -   Date of Injury - 05-28-24  Body part injured - Neck, Back, LT Arm  Current work status per last visit - Regular Duty  Improved, same, or worse - Worse  Pain Scale right now (1-10) -  8/10    Arm Injury       Neck: Positive for neck pain and neck stiffness.   Musculoskeletal:  Positive for pain, joint pain, abnormal ROM of joint and muscle ache. Negative for trauma, joint swelling and muscle cramps.   Neurological:  Negative for dizziness, light-headedness and headaches.       See MA note above. 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 She reports that she has been using motrin or tylenol without improvement, so she discontinued. 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 She also notes difficulties at home as her  is out of town and she is home alone. She reports increased neck and upper back pain. Left more than right, she feels she is using the right side more due to the left arm issues. Worse with sitting for prolonged periods. She states that she has requested a new chair. She started neck and back exercises yesterday. She would like to try some pain medications or a muscle relaxer.   She also reports pin to the anterior chest bilateral below breast with deep breathing. Notes she is uncertain if a rib was injured.     Objective:     Physical Exam  Vitals and nursing note reviewed.   Constitutional:       General: She is not in acute distress.     Appearance: Normal appearance. She is not ill-appearing.   HENT:      Head: Normocephalic and atraumatic.      Nose: Nose normal.      Mouth/Throat:      Mouth: Mucous membranes are moist.   Eyes:      Extraocular Movements: Extraocular movements intact.       Conjunctiva/sclera: Conjunctivae normal.      Pupils: Pupils are equal, round, and reactive to light.   Cardiovascular:      Rate and Rhythm: Normal rate and regular rhythm.      Pulses: Normal pulses.      Heart sounds: Normal heart sounds.   Pulmonary:      Effort: Pulmonary effort is normal.      Breath sounds: Normal breath sounds.   Abdominal:      General: Bowel sounds are normal.      Palpations: Abdomen is soft.   Musculoskeletal:      Left shoulder: Tenderness and bony tenderness present. No swelling, deformity, effusion, laceration or crepitus. Normal range of motion. Normal strength. Normal pulse.      Left elbow: No swelling, deformity, effusion or lacerations. Normal range of motion. Tenderness present in medial epicondyle and lateral epicondyle. No radial head or olecranon process tenderness.        Arms:       Cervical back: Normal range of motion.      Comments: L shoulder with anterior shoulder TTP. Pain to the AC joint and posterior upper arm toward elbow.   FORWARD FLEXION restricted on right (surgical scar over elbow), full extension bilaterally. Abduction is decreased bilaterally, 90 on left and 80 on right. No shoulder dyskinesis. Slight cervical kyphosis noted when standing.   No pain with resisted pronation and supination. Full flexion and extension.        Skin:     General: Skin is warm.      Capillary Refill: Capillary refill takes less than 2 seconds.   Neurological:      General: No focal deficit present.      Mental Status: She is alert and oriented to person, place, and time.   Psychiatric:         Mood and Affect: Mood normal.         Behavior: Behavior normal.         Thought Content: Thought content normal.         Judgment: Judgment normal.          Assessment:      1. Contusion of left shoulder, subsequent encounter    2. Encounter related to worker's compensation claim    3. Contusion of left elbow, subsequent encounter    4. Strain of neck muscle, initial encounter      Plan:      I reviewed the prior clinic notes related to this injury, contusion of the left arm due to falling from chair. I have recommended that given her increased symptoms with sitting she undergo a course of PT to address postural issues, referral sent. She will trial a low dose muscle relaxer at bedtime to assist in alleviating discomfort with sleeping and increased muscle tension.  She was also advised to trial an over-the-counter muscle rub or Voltaren gel to address myofascial symptoms of the upper back.  Advised concurrent use of Tylenol and ibuprofen to address pain symptoms.  The suggestion of an ergonomics assessment for her workstation was broached, but she declines. No work restrictions were placed as she feels she can continue to work regular duty. No concern for rib or lung injury as she is well over 1 month out from [facility] and her oxygen saturation levels are normal. Follow-up in 1 week to assess how she is tolerating medication.      Medications Ordered This Encounter   Medications    cyclobenzaprine (FLEXERIL) 5 MG tablet     Sig: Take 1 tablet (5 mg total) by mouth 3 (three) times daily as needed for Muscle spasms.     Dispense:  30 tablet     Refill:  0     Diagnoses and plan discussed with the patient, as well as the expected course and duration of symptoms. Risks and benefits of any medication prescribed during this visit was explained, verbal instructions on use given. Clinic/Emergency department return precautions were given, can return to [facility] before scheduled follow-up appointment if notes worsening/aggravation of symptoms. All questions and concerns were addressed prior to discharge. Plan was developed with active input from the patient and they verbalized understanding of and agreement with the POC.  [facility] was informed of any referrals and relevant orders.  Note was dictated with voice recognition software, please excuse any grammatical errors.    I spent a total of 30 minutes on the day of  the visit.  This includes face to face time and non-face to face time preparing to see the patient (e.g. review of medical record), obtaining and/or reviewing separately obtained history, documenting clinical information in the electronic or other health record, independently interpreting results and communicating results to the patient/family/caregiver, or care coordinator.    Patient Instructions: Begin Physical Therapy, PT to be scheduled once authorized (Avoid driving while using muscle relaxer, instructions on initiating use are on AVS)   Restrictions: Regular Duty  Follow up in about 1 week (around 7/12/2024).

## 2024-07-12 ENCOUNTER — OFFICE VISIT (OUTPATIENT)
Dept: URGENT CARE | Facility: CLINIC | Age: 69
End: 2024-07-12
Payer: COMMERCIAL

## 2024-07-12 VITALS
BODY MASS INDEX: 18.78 KG/M2 | SYSTOLIC BLOOD PRESSURE: 117 MMHG | DIASTOLIC BLOOD PRESSURE: 75 MMHG | OXYGEN SATURATION: 99 % | HEART RATE: 79 BPM | RESPIRATION RATE: 16 BRPM | HEIGHT: 64 IN | WEIGHT: 110 LBS

## 2024-07-12 DIAGNOSIS — Z02.6 ENCOUNTER RELATED TO WORKER'S COMPENSATION CLAIM: ICD-10-CM

## 2024-07-12 DIAGNOSIS — S50.02XD CONTUSION OF LEFT ELBOW, SUBSEQUENT ENCOUNTER: Primary | ICD-10-CM

## 2024-07-12 DIAGNOSIS — S40.012D CONTUSION OF LEFT SHOULDER, SUBSEQUENT ENCOUNTER: ICD-10-CM

## 2024-07-12 DIAGNOSIS — S16.1XXA STRAIN OF NECK MUSCLE, INITIAL ENCOUNTER: ICD-10-CM

## 2024-07-12 RX ORDER — DICLOFENAC SODIUM 10 MG/G
2 GEL TOPICAL 3 TIMES DAILY
Qty: 100 G | Refills: 0 | Status: SHIPPED | OUTPATIENT
Start: 2024-07-12

## 2024-07-12 NOTE — PROGRESS NOTES
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    Chief Complaint: Arm Injury    Patient's place of employment - [employer] [facility] [place]  Patient's job title - physician  Date of Injury - 05-28-24  Body part injured - Neck, Back, LT Arm  Current work status per last visit - Regular Duty  Improved, same, or worse - improved  Pain Scale right now (1-10) -  2/10    KW    See MA note above. Begin MD note:  Dr. Stallings says that her symptoms are improving since her last visit. She only took one of the prescribed Flexeril which made her significantly drowsy so she discontinued it. She says the PT referral was approved and she was contacted for scheduling, but the first available appointment isn't until next week so she declined. She was having significant pain at her last visit but since her symptoms are improving, she no longer feels the need for treatment. Reports soreness to her upper arm and neck and tenderness to left elbow, all improved from previous.       Neck: Positive for neck pain. Negative for neck stiffness.   Musculoskeletal:  Positive for pain and muscle ache. Negative for trauma, joint swelling, abnormal ROM of joint and muscle cramps.   Neurological:  Negative for dizziness, light-headedness and headaches.     Objective:     Physical Exam  Vitals and nursing note reviewed.   Constitutional:       General: She is not in acute distress.     Appearance: She is not ill-appearing.   HENT:      Head: Normocephalic.   Eyes:      Conjunctiva/sclera: Conjunctivae normal.   Pulmonary:      Effort: No respiratory distress.   Musculoskeletal:      Left shoulder: No tenderness or bony tenderness. Normal range of motion.      Left upper arm: Tenderness (bicep and tricep soreness) present. No swelling or bony tenderness.      Left elbow: No swelling or deformity. Normal range of motion. Tenderness present in medial epicondyle. No radial head, lateral epicondyle or olecranon process tenderness.      Cervical  back: Muscular tenderness (distal cervical and proximal thoracic paraspinal muscles) present. No pain with movement or spinous process tenderness. Normal range of motion.   Skin:     General: Skin is warm and dry.   Neurological:      Mental Status: She is alert and oriented to person, place, and time.   Psychiatric:         Attention and Perception: Attention normal.         Mood and Affect: Mood normal.         Behavior: Behavior normal.        Assessment:      1. Contusion of left elbow, subsequent encounter    2. Encounter related to worker's compensation claim    3. Contusion of left shoulder, subsequent encounter    4. Strain of neck muscle, initial encounter      Plan:     Dr. 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 She will continue conservative measures at home. She has not yet tried the previously discussed voltaren gel and would like a prescription today. She will again try to contact her  regarding coverage or reimbursement for OTC medications and treatments. Indications for f/u discussed. Patient voiced understanding and agreement with the plan of care.     Medications Ordered This Encounter   Medications    diclofenac sodium (VOLTAREN) 1 % Gel     Sig: Apply 2 g topically 3 (three) times daily.     Dispense:  100 g     Refill:  0         Restrictions: Regular Duty  Follow up if symptoms worsen or fail to improve.    I spent a total of 30 minutes on the day of the visit.   This includes face to face time and non-face to face time preparing to see the patient (eg, review of tests, prior records/notes), obtaining and/or reviewing separately obtained history, documenting clinical information in the electronic or other health record.

## 2024-07-12 NOTE — LETTER
[facility]  [address] [address]  Phone: [phone]  Fax: [phone]  [employer] [employer]: [email or web address]    Pt Name: Ciera Stallings MD  Injury Date: 05/28/2024   Employee ID: [id number] Date of Treatment: 07/12/2024   Company: [employer]      Appointment Time: 09:00 AM Arrived: 8:15 AM    Provider: Jodi Aguilar MD Time Out:9:12 AM      Office Treatment:   1. Encounter related to worker's compensation claim    2. Contusion of left shoulder, subsequent encounter    3. Contusion of left elbow, subsequent encounter    4. Strain of neck muscle, initial encounter      Medications Ordered This Encounter   Medications    diclofenac sodium (VOLTAREN) 1 % Gel           Restrictions: Regular Duty     Return As needed. KENRICK

## 2024-12-23 ENCOUNTER — TELEPHONE (OUTPATIENT)
Dept: URGENT CARE | Facility: CLINIC | Age: 69
End: 2024-12-23
Payer: COMMERCIAL

## 2024-12-23 NOTE — TELEPHONE ENCOUNTER
Return call to patient to inform her that I had to check with her  to see if her case is still open since she was released back to regular duty since 7/12/2024 and the patient had hung up the phone before I could get an answer from her .

## 2024-12-30 ENCOUNTER — TELEPHONE (OUTPATIENT)
Dept: URGENT CARE | Facility: CLINIC | Age: 69
End: 2024-12-30
Payer: COMMERCIAL

## 2024-12-30 NOTE — TELEPHONE ENCOUNTER
Due to the follow up from the  Meagan Shearer, I tried reaching out to the patient to see if I could get her scheduled for a RAD Visit and I received the patients voice mail, left a message for the patient and the reason for the call.  AFG

## 2025-01-02 ENCOUNTER — CLINICAL SUPPORT (OUTPATIENT)
Dept: AUDIOLOGY | Facility: CLINIC | Age: 70
End: 2025-01-02
Payer: COMMERCIAL

## 2025-01-02 ENCOUNTER — OFFICE VISIT (OUTPATIENT)
Dept: URGENT CARE | Facility: CLINIC | Age: 70
End: 2025-01-02
Payer: COMMERCIAL

## 2025-01-02 ENCOUNTER — OFFICE VISIT (OUTPATIENT)
Dept: OTOLARYNGOLOGY | Facility: CLINIC | Age: 70
End: 2025-01-02
Payer: COMMERCIAL

## 2025-01-02 VITALS
SYSTOLIC BLOOD PRESSURE: 113 MMHG | DIASTOLIC BLOOD PRESSURE: 77 MMHG | HEIGHT: 64 IN | HEART RATE: 80 BPM | BODY MASS INDEX: 18.85 KG/M2 | WEIGHT: 110.38 LBS

## 2025-01-02 DIAGNOSIS — H90.3 BILATERAL SENSORINEURAL HEARING LOSS: ICD-10-CM

## 2025-01-02 DIAGNOSIS — H90.3 SENSORINEURAL HEARING LOSS (SNHL), BILATERAL: Primary | ICD-10-CM

## 2025-01-02 DIAGNOSIS — S43.402D SPRAIN OF LEFT SHOULDER, UNSPECIFIED SHOULDER SPRAIN TYPE, SUBSEQUENT ENCOUNTER: ICD-10-CM

## 2025-01-02 DIAGNOSIS — M24.812 INTERNAL DERANGEMENT OF LEFT SHOULDER: Primary | ICD-10-CM

## 2025-01-02 DIAGNOSIS — S40.012D CONTUSION OF LEFT SHOULDER, SUBSEQUENT ENCOUNTER: ICD-10-CM

## 2025-01-02 DIAGNOSIS — H61.23 BILATERAL IMPACTED CERUMEN: Primary | ICD-10-CM

## 2025-01-02 DIAGNOSIS — S50.02XD CONTUSION OF LEFT ELBOW, SUBSEQUENT ENCOUNTER: ICD-10-CM

## 2025-01-02 PROCEDURE — 92557 COMPREHENSIVE HEARING TEST: CPT | Mod: S$GLB,,,

## 2025-01-02 PROCEDURE — 99999 PR PBB SHADOW E&M-EST. PATIENT-LVL I: CPT | Mod: PBBFAC,,,

## 2025-01-02 PROCEDURE — 92567 TYMPANOMETRY: CPT | Mod: S$GLB,,,

## 2025-01-02 PROCEDURE — 99999 PR PBB SHADOW E&M-EST. PATIENT-LVL III: CPT | Mod: PBBFAC,,, | Performed by: OTOLARYNGOLOGY

## 2025-01-02 RX ORDER — DICLOFENAC SODIUM 10 MG/G
2 GEL TOPICAL 2 TIMES DAILY
Qty: 100 G | Refills: 1 | Status: SHIPPED | OUTPATIENT
Start: 2025-01-02 | End: 2025-01-28

## 2025-01-02 RX ORDER — METHOCARBAMOL 500 MG/1
500 TABLET, FILM COATED ORAL 3 TIMES DAILY
Qty: 30 TABLET | Refills: 0 | Status: SHIPPED | OUTPATIENT
Start: 2025-01-02 | End: 2025-01-13

## 2025-01-02 RX ORDER — FLUOCINOLONE ACETONIDE 0.25 MG/G
CREAM TOPICAL DAILY
Qty: 15 G | Refills: 1 | Status: SHIPPED | OUTPATIENT
Start: 2025-01-02

## 2025-01-02 NOTE — PROGRESS NOTES
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    Chief Complaint: Arm Injury    Patient's place of employment - 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  SHE HAS BEEN PRESCRIBED FLEXERIL HOWEVER STATES IT MAKES HER SEDATED AND THE REASON SHE IS HERE TODAY IS BECAUSE IT HAS NOT RESOLVED COMPLETELY IN HIS HAVING PERSISTENT PAIN OF THE LEFT UPPER ARM SPECIFICALLY THE DELTOID REGION AND UPPER ARM FROM ELBOW TO SHOULDER.  THERE IS NO BRUISING OR EDEMA HOWEVER SIGNIFICANT DISCOMFORT WITH INTERNAL AND EXTERNAL ROTATION AS SHE REPORTS SUBJECTIVE COMPLAINTS OF PAIN AND LIMITATIONS OF RANGE OF MOTION WHEN REACHING FOR SEATBELT, PUTTING ON BRA AND OTHER INTERNAL EXTERNAL RANGES.  DISTALLY NEUROVASCULARLY INTACT WITH NORMAL  STRENGTH.  SHE IS AND HAS BEEN WORKING REGULAR DUTY AND WILL CONTINUE TO DO SO HOWEVER NOW IS AMENABLE TO PHYSICAL THERAPY.  PHYSICAL THERAPY ORDERED AND DUE TO PERSISTENCE OF PAIN AND CONCERN FOR INTERNAL DERANGEMENT MRI OF THE LEFT SHOULDER ORDERED.  ALTHOUGH SHE DID NOT TOLERATE FLEXERIL, WILL DO LOW-DOSE ROBAXIN AS WELL AS VOLTAREN GEL AND OVER-THE-COUNTER TYLENOL AS NEEDED.  SHE WILL RETURN IN 2 WEEKS FOR RE-EVALUATION AND TO ASSESS THE STATUS OF HER REFERRALS.        ROS  Constitution: Negative for chills, fatigue and fever.   HENT:  Negative for ear pain, sinus pain and sore throat.    Neck: Negative for neck pain and neck stiffness.   Cardiovascular:  Negative  for chest pain, palpitations and sob on exertion.   Eyes:  Negative for eye pain and vision loss.   Respiratory:  Negative for cough, shortness of breath and asthma.    Gastrointestinal:  Negative for abdominal pain, nausea, vomiting and diarrhea.   Genitourinary:  Negative for dysuria, frequency and hematuria.   Musculoskeletal:  Positive for pain, joint swelling, abnormal ROM of joint and muscle ache. Negative for trauma, back pain and muscle cramps.   Skin:  Negative for rash and wound.   Allergic/Immunologic: Negative for seasonal allergies and asthma.   Neurological:  Negative for dizziness, light-headedness, headaches and altered mental status.   Psychiatric/Behavioral:  Negative for altered mental status and confusion.      Objective:     Physical Exam  Vitals and nursing note reviewed.   Constitutional:       General: She is not in acute distress.     Appearance: Normal appearance. She is well-developed. She is not ill-appearing, toxic-appearing or diaphoretic.   HENT:      Head: Normocephalic and atraumatic. No abrasion, contusion or laceration.      Jaw: No trismus.      Right Ear: Hearing, tympanic membrane, ear canal and external ear normal. No hemotympanum.      Left Ear: Hearing, tympanic membrane, ear canal and external ear normal. No hemotympanum.      Nose: Nose normal. No nasal deformity, mucosal edema or rhinorrhea.      Right Sinus: No maxillary sinus tenderness or frontal sinus tenderness.      Left Sinus: No maxillary sinus tenderness or frontal sinus tenderness.      Mouth/Throat:      Dentition: Normal dentition.      Pharynx: Uvula midline. No posterior oropharyngeal erythema or uvula swelling.   Eyes:      General: Lids are normal. No scleral icterus.        Right eye: No discharge.         Left eye: No discharge.      Conjunctiva/sclera: Conjunctivae normal.      Pupils: Pupils are equal, round, and reactive to light.      Comments: Sclera clear bilat   Neck:      Trachea: Trachea and  phonation normal. No tracheal deviation.   Cardiovascular:      Rate and Rhythm: Normal rate and regular rhythm.      Pulses: Normal pulses.      Heart sounds: Normal heart sounds.   Pulmonary:      Effort: Pulmonary effort is normal. No respiratory distress.      Breath sounds: Normal breath sounds.   Abdominal:      General: Bowel sounds are normal. There is no distension.      Palpations: Abdomen is soft. There is no mass or pulsatile mass.      Tenderness: There is no abdominal tenderness.   Musculoskeletal:         General: Tenderness and signs of injury present. No swelling or deformity.      Cervical back: Full passive range of motion without pain, normal range of motion and neck supple. No rigidity. No spinous process tenderness or muscular tenderness.      Comments: LEFT UPPER EXTREMITY EXAM SHOWS SIGNIFICANT DISCOMFORT WITH INTERNAL AND EXTERNAL ROTATION AS WELL AS ABDUCTION AGAINST RESISTANCE AT 90°.  MILD TENDERNESS TO PALPATION ALONG THE BICEPS AND MEDIAL ELBOW AND DELTOID LATERAL AND POSTERIOR.  NO SIGNIFICANT AC JOINT OR TRAPEZIUS PAIN.  HAS BEEN IMAGED AND NO BONY ABNORMALITY.  CONCERN FOR ROTATOR CUFF OR LABRUM TEAR OR INTERNAL DERANGEMENT AND MRI ORDERED FOR FURTHER IMAGING.  PHYSICAL THERAPY ALSO ORDERED.   Skin:     General: Skin is warm and dry.      Capillary Refill: Capillary refill takes less than 2 seconds.      Coloration: Skin is not pale.      Findings: No abrasion, bruising, burn, ecchymosis or laceration.   Neurological:      Mental Status: She is alert and oriented to person, place, and time.      GCS: GCS eye subscore is 4. GCS verbal subscore is 5. GCS motor subscore is 6.      Cranial Nerves: No cranial nerve deficit.      Sensory: No sensory deficit.      Motor: No abnormal muscle tone or seizure activity.      Coordination: Coordination normal.   Psychiatric:         Speech: Speech normal.         Behavior: Behavior normal. Behavior is cooperative.         Thought Content: Thought  content normal.         Judgment: Judgment normal.        Assessment:      1. Internal derangement of left shoulder    2. Contusion of left shoulder, subsequent encounter    3. Contusion of left elbow, subsequent encounter    4. Sprain of left shoulder, unspecified shoulder sprain type, subsequent encounter      Plan:     DR. 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  SHE HAS BEEN PRESCRIBED FLEXERIL HOWEVER STATES IT MAKES HER SEDATED AND THE REASON SHE IS HERE TODAY IS BECAUSE IT HAS NOT RESOLVED COMPLETELY IN HIS HAVING PERSISTENT PAIN OF THE LEFT UPPER ARM SPECIFICALLY THE DELTOID REGION AND UPPER ARM FROM ELBOW TO SHOULDER.  THERE IS NO BRUISING OR EDEMA HOWEVER SIGNIFICANT DISCOMFORT WITH INTERNAL AND EXTERNAL ROTATION AS SHE REPORTS SUBJECTIVE COMPLAINTS OF PAIN AND LIMITATIONS OF RANGE OF MOTION WHEN REACHING FOR SEATBELT, PUTTING ON BRA AND OTHER INTERNAL EXTERNAL RANGES.  DISTALLY NEUROVASCULARLY INTACT WITH NORMAL  STRENGTH.  SHE IS AND HAS BEEN WORKING REGULAR DUTY AND WILL CONTINUE TO DO SO HOWEVER NOW IS AMENABLE TO PHYSICAL THERAPY.  PHYSICAL THERAPY ORDERED AND DUE TO PERSISTENCE OF PAIN AND CONCERN FOR INTERNAL DERANGEMENT MRI OF THE LEFT SHOULDER ORDERED.  ALTHOUGH SHE DID NOT TOLERATE FLEXERIL, WILL DO LOW-DOSE ROBAXIN AS WELL AS VOLTAREN GEL AND OVER-THE-COUNTER TYLENOL AS NEEDED.  SHE WILL RETURN IN 2 WEEKS FOR RE-EVALUATION AND TO ASSESS THE STATUS OF HER REFERRALS.  Medications Ordered This Encounter   Medications    diclofenac sodium (VOLTAREN ARTHRITIS PAIN) 1 % Gel     Sig: Apply 2 g topically 2 (two) times daily. for 10 days     Dispense:  100 g     Refill:  1    methocarbamoL (ROBAXIN) 500 MG Tab     Sig: Take 1 tablet (500 mg  total) by mouth 3 (three) times daily. for 10 days     Dispense:  30 tablet     Refill:  0     Patient Instructions: Attention not to aggravate affected area, MRI to be scheduled once authorized, PT to be scheduled once authorized, Begin Physical Therapy   Restrictions: Regular Duty  Follow up in about 2 weeks (around 1/16/2025).

## 2025-01-02 NOTE — PROGRESS NOTES
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  Patient's main complaint was decreased hearing, bilaterally. Dr. Stallings reported she had her hearing tested in May 2024. She reported it was recommended she receive medical clearance for hearing aids due to an asymmetry between ears. 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 Dr. Stallings denied tinnitus, otalgia, aural fullness, true vertigo, and history of noise exposure.    Tympanometry revealed Type Ad in the right ear and Type Ad in the left ear.     Audiogram results revealed a mild to moderate sensorineural hearing loss (SNHL), bilaterally.     Speech reception thresholds were noted at 30 dBHL in the right ear and 40 dBHL in the left ear.    Speech discrimination scores were 84% in the right ear and 88% in the left ear.    Recommendations:  Otologic evaluation  Hearing aid consultation  Annual audiogram or sooner if change perceived  Hearing protection in noise

## 2025-01-02 NOTE — LETTER
[facility]  [address] [address]  Phone: [phone]  Fax: [phone]  [facility] [employer]: [email or web address]    Pt Name: Ciera Stallings MD  Injury Date: 05/28/2024   Employee ID: [id number] Date of Treatment: 01/02/2025   Company: [employer]      Appointment Time: 08:45 AM Arrived: 8:40 AM    Provider: Guanakito Dick MD Time Out:10:11 AM      Office Treatment:   1. Internal derangement of left shoulder    2. Contusion of left shoulder, subsequent encounter    3. Contusion of left elbow, subsequent encounter    4. Sprain of left shoulder, unspecified shoulder sprain type, subsequent encounter      Medications Ordered This Encounter   Medications    diclofenac sodium (VOLTAREN ARTHRITIS PAIN) 1 % Gel    methocarbamoL (ROBAXIN) 500 MG Tab        Patient Instructions: Attention not to aggravate affected area, MRI to be scheduled once authorized, PT to be scheduled once authorized, Begin Physical Therapy      Restrictions: Regular Duty     Return Appointment: 1/16/2025 at 8:30 AM [facility] [place]

## 2025-01-02 NOTE — PROGRESS NOTES
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  She has noted some decline in her hearing over the last several years.  She also experiences frequent cerumen accumulation bilaterally, left-greater-than-right.  She has no otalgia, otorrhea, vertigo or tinnitus..    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    Past Surgical History:   Procedure Laterality Date    APPENDECTOMY N/A     ELBOW FRACTURE SURGERY Right     HIP FRACTURE SURGERY Left         Family History  Her family history includes Cancer in her father; Diabetes in her brother, mother, and sister; Hearing loss in her sister; Hypertension in her mother.    Social History  She reports that she has never smoked. She has never used smokeless  tobacco. She reports that she does not drink alcohol and does not use drugs.    Allergies  She has No Known Allergies.    Medications  She has a current medication list which includes the following prescription(s): amlodipine, atorvastatin, blood-glucose meter, dapagliflozin propanediol, diclofenac sodium, diclofenac sodium, fenofibrate micronized, fluocinolone, losartan, metformin, methocarbamol, metoprolol succinate, paroxetine, and abrysvo (pf).    ROS:  Pertinent positive and negative review of systems as noted in HPI.     Objective:     There were no vitals taken for this visit.   General Appearance:   Awake, Alert and Oriented. NAD. Appropriate affect and appearance      Neuro:   Spontaneous eye opening, appropriate verbal responses, follows commands  Pupils equal, round & brisk. EOMI, no proptosis, no spontaneous nystagmus  Face is symmetric, HB I, non-edematous and SILT bilaterally  Vision grossly intact, Hearing grossly intact  JOSIE, normal tone     Head and Face:   skin is intact, facial movement symmetric     Ears:  Periauricular regions non-erythematous, non-fluctuanct non-tender  Pinna normal bilaterally, no skin lesions  EACs with cerumen, left-greater-than-right (see procedure note)  Nose:   External nose is symmetric, no skin lesions  Septum midline, No inferior turbinate hypertrophy, No polyps or rhinorrhea     OC/OP:  Tongue midline on extension, non-edematous, soft  No labial, buccal, oral tongue or floor of mouth lesions  Soft palate symmetric, midline and without lesions or masses, tonsils symmetric  No masses or lesions of the visualized oropharynx     Neck:  Neck is symmetric, non-edematous, non-erythematous  Trachea is midline and easily palpable,  No palpable adenopathy or masses in levels I-VI     Glands:  Parotid and submandibular glands are symmetric and non-tender.   No thyroid nodules or masses, non-tender      Respiratory:  Normal work of breathing, no accessory muscle use, no  stridor     Voice:  Normal vocal quality, volume, prosody and articulation   Data Review:       AUDIO        Procedures:     Procedure: Cerumen removal 45604     Pre procedure Diagnosis: bilateral Cerumen Impaction     Post procedure Diagnosis: same     Instrument: Binocular Microscope     Anesthesia: None     Procedure: After verbal consent was obtained, the patient was laid supine in the small procedure room. A microscope was used to examine the ear. Instrumentation and suction were used to remove any cerumen from the EAC. If indicated, the procedure was repeated on the contralateral side. The patient tolerated the procedure well and without any acute complication. Findings below.      Findings:               Right Ear:               EAC: Normal, Cerumen filled              Tympanic membrane: Intact              Middle Ear: No effusion present and Ossicles in normal position  Left Ear:               EAC: Normal Cerumen filled              Tympanic membrane: Intact              Middle Ear: No effusion present and Ossicles in normal position     The cerumen was removed completely from both ears.       Assessment:     No diagnosis found.    Plan:     I had a long discussion with the patient regarding her condition and the further workup and management options.  The ears were cleaned without difficulty.  Audiogram was reviewed with her.  Hearing aids are appropriate management for her bilateral sensorineural hearing loss.  She will set up an appointment with the hearing aid Center for fitting.    No orders of the defined types were placed in this encounter.     Medications Ordered This Encounter   Medications    fluocinolone (SYNALAR) 0.025 % cream      Problem List Items Addressed This Visit    None

## 2025-01-06 ENCOUNTER — TELEPHONE (OUTPATIENT)
Dept: URGENT CARE | Facility: CLINIC | Age: 70
End: 2025-01-06

## 2025-01-08 ENCOUNTER — TELEPHONE (OUTPATIENT)
Dept: URGENT CARE | Facility: CLINIC | Age: 70
End: 2025-01-08

## 2025-01-09 ENCOUNTER — CLINICAL SUPPORT (OUTPATIENT)
Dept: REHABILITATION | Facility: HOSPITAL | Age: 70
End: 2025-01-09
Attending: EMERGENCY MEDICINE
Payer: COMMERCIAL

## 2025-01-09 DIAGNOSIS — G89.29 CHRONIC LEFT SHOULDER PAIN: Primary | ICD-10-CM

## 2025-01-09 DIAGNOSIS — M25.612 DECREASED ROM OF LEFT SHOULDER: ICD-10-CM

## 2025-01-09 DIAGNOSIS — M25.512 CHRONIC LEFT SHOULDER PAIN: Primary | ICD-10-CM

## 2025-01-09 DIAGNOSIS — R29.3 ABNORMAL POSTURE: ICD-10-CM

## 2025-01-09 PROCEDURE — 97110 THERAPEUTIC EXERCISES: CPT

## 2025-01-09 PROCEDURE — 97161 PT EVAL LOW COMPLEX 20 MIN: CPT

## 2025-01-10 ENCOUNTER — TELEPHONE (OUTPATIENT)
Dept: URGENT CARE | Facility: CLINIC | Age: 70
End: 2025-01-10

## 2025-01-10 NOTE — PROGRESS NOTES
"[facility]   Physical Therapy Treatment Note      Name: Ciera MD Chandrakant  Clinic Number: [id number]    Therapy Diagnosis:   Encounter Diagnoses   Name Primary?    Chronic left shoulder pain Yes    Decreased ROM of left shoulder     Abnormal posture      Physician: Guanakito Dick MD    Visit Date: 1/13/2025    Physician Orders: PT Eval and Treat   Medical Diagnosis from Referral:   M24.812 (ICD-10-CM) - Internal derangement of left shoulder   S40.012D (ICD-10-CM) - Contusion of left shoulder, subsequent encounter   S50.02XD (ICD-10-CM) - Contusion of left elbow, subsequent encounter   S43.402D (ICD-10-CM) - Sprain of left shoulder, unspecified shoulder sprain type, subsequent encounter   Evaluation Date: 1/9/2025  Authorization Period Expiration: 1/2/26  Plan of Care Expiration: 3/9/2025  Visit # / Visits authorized: 1/1, 1/12     Foto  Date  Score    #1/3 1/9/2025 50   #2/3       #3/3             Precautions: Standard     PTA Visit #: 1/5     Time In: 6:58 am   Time Out: 7:54 am   Total Billable Time: 56 minutes    Subjective     Pt reports: that her shoulder is feeling about the same and is having more pain into her arm, versus the joint itself. Pt does not quantify pain on scale.  Pt states that turning her arm into pronation and reaching up, back or down is also painful.   She was compliant with home exercise program.  Response to previous treatment: last session was initial eval  Functional change: none at this time     Pain: *see subjective*   Location:    Objective      Objective Measures updated at progress report unless specified.     Treatment     Ciera received the treatments listed below:      therapeutic exercises to develop strength, endurance, ROM, and flexibility for 15 minutes including:  Pulleys 3 min flex/abd   Wall slides flex/abd 5" 2x10  Wand flexion/ER 3" 2x10   HEP review       neuromuscular re-education activities to improve: Coordination, Proprioception, " "Posture, and Motor control for 41 minutes. The following activities were included:  Scapular retraction 3" 2x10   Cervical retractions into pillow 3" 2x10   Shld isometric (ER/IR/abd/flex/ext) 5" x10   Weight shifts @ EOM  Shld taps @ EOM   Wall circles 30x cw/ccw     therapeutic activities to improve functional performance for 0  minutes, including:        Patient Education and Home Exercises       Education provided:   - HEP    Written Home Exercises Provided: yes. Exercises were reviewed and Ciera was able to demonstrate them prior to the end of the session.  Ciera demonstrated good  understanding of the education provided. See EMR under Patient Instructions for exercises provided during therapy sessions    Assessment     Initiated therapy program following pt's initial eval. Focused session on improving left shoulder ROM and RTC/periscapular strength and stabilization. Pt initially noted pain with pulleys, however decreased with repetition. Tactile cues provided for proper alignment during shoulder isometrics.     Ciera Is progressing well towards her goals.   Pt prognosis is Good.     Pt will continue to benefit from skilled outpatient physical therapy to address the deficits listed in the problem list box on initial evaluation, provide pt/family education and to maximize pt's level of independence in the home and community environment.     Pt's spiritual, cultural and educational needs considered and pt agreeable to plan of care and goals.     Anticipated barriers to physical therapy: none    Goals:   Short Term Goals (4 Weeks):   1. Pt will be independent with HEP to supplement PT in improving functional use of R UE.  2. Pt will increase pain free L shoulder flexion and abduction PROM to at least >150 deg to improve functional mobility of UE  3. Pt will increase L shoulder ER / IR PROM to at least 90/80 deg to improve functional mobility of UE  4. Pt will improve sitting posture without cueing from therapist "   Long Term Goals (8 Weeks):   1. Pt will improve FOTO score to >/=65 to decrease perceived limitation with carrying, moving, and handling objects.  2. Pt will increase L shoulder PROM to WNL in all planes to improve functional use of R RUE.  3. Pt will increase L shoulder AROM to WFL in all planes to improve functional use of R RUE.  4. Pt will improve L shoulder MMTs to = 4+/5 to promote equal use of B UEs in performing functional tasks.  5. Pt will lift 15 lb objects without pain to promote functional QOL.   6. Pt to report pain </= 0/10 with ADLs and IADLs using L UE to improve functional QOL.    Plan     Plan of care Certification: 1/9/2025 to 3/9/2025.     Outpatient Physical Therapy 3 times weekly for 8 weeks to include the following interventions: Cervical/Lumbar Traction, Electrical Stimulation re-eval, dry needling, Gait Training, Iontophoresis (with ), Manual Therapy, Moist Heat/ Ice, Neuromuscular Re-ed, Patient Education, Self Care, Therapeutic Activities, and Therapeutic Exercise.      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      Physical therapist and physical therapy assistant(s) will met face to face to discuss patient's treatment plan and progress towards established goals. Pt will be seen by a physical therapist minimally every 6th visit or every 30 days.    PT/PTA met face to face to discuss pt's treatment plan and progress towards established goals. Pt will be seen by a physical therapist minimally every 6th visit or every 30 days.      Richelle Okeefe PTA

## 2025-01-13 ENCOUNTER — CLINICAL SUPPORT (OUTPATIENT)
Dept: REHABILITATION | Facility: HOSPITAL | Age: 70
End: 2025-01-13
Payer: COMMERCIAL

## 2025-01-13 DIAGNOSIS — R29.3 ABNORMAL POSTURE: ICD-10-CM

## 2025-01-13 DIAGNOSIS — G89.29 CHRONIC LEFT SHOULDER PAIN: Primary | ICD-10-CM

## 2025-01-13 DIAGNOSIS — M25.612 DECREASED ROM OF LEFT SHOULDER: ICD-10-CM

## 2025-01-13 DIAGNOSIS — M25.512 CHRONIC LEFT SHOULDER PAIN: Primary | ICD-10-CM

## 2025-01-13 PROCEDURE — 97112 NEUROMUSCULAR REEDUCATION: CPT | Mod: CQ

## 2025-01-13 PROCEDURE — 97110 THERAPEUTIC EXERCISES: CPT | Mod: CQ

## 2025-01-14 ENCOUNTER — HOSPITAL ENCOUNTER (OUTPATIENT)
Dept: RADIOLOGY | Facility: HOSPITAL | Age: 70
Discharge: HOME | End: 2025-01-14
Attending: EMERGENCY MEDICINE
Payer: COMMERCIAL

## 2025-01-14 DIAGNOSIS — M24.812 INTERNAL DERANGEMENT OF LEFT SHOULDER: ICD-10-CM

## 2025-01-14 DIAGNOSIS — S43.402D SPRAIN OF LEFT SHOULDER, UNSPECIFIED SHOULDER SPRAIN TYPE, SUBSEQUENT ENCOUNTER: ICD-10-CM

## 2025-01-14 DIAGNOSIS — S50.02XD CONTUSION OF LEFT ELBOW, SUBSEQUENT ENCOUNTER: ICD-10-CM

## 2025-01-14 DIAGNOSIS — S40.012D CONTUSION OF LEFT SHOULDER, SUBSEQUENT ENCOUNTER: ICD-10-CM

## 2025-01-14 PROCEDURE — 73221 MRI JOINT UPR EXTREM W/O DYE: CPT | Mod: TC,LT

## 2025-01-14 PROCEDURE — 73221 MRI JOINT UPR EXTREM W/O DYE: CPT | Mod: 26,LT,, | Performed by: RADIOLOGY

## 2025-01-15 ENCOUNTER — CLINICAL SUPPORT (OUTPATIENT)
Dept: REHABILITATION | Facility: HOSPITAL | Age: 70
End: 2025-01-15
Payer: COMMERCIAL

## 2025-01-15 DIAGNOSIS — G89.29 CHRONIC LEFT SHOULDER PAIN: Primary | ICD-10-CM

## 2025-01-15 DIAGNOSIS — M25.612 DECREASED ROM OF LEFT SHOULDER: ICD-10-CM

## 2025-01-15 DIAGNOSIS — M25.512 CHRONIC LEFT SHOULDER PAIN: Primary | ICD-10-CM

## 2025-01-15 DIAGNOSIS — R29.3 ABNORMAL POSTURE: ICD-10-CM

## 2025-01-15 PROCEDURE — 97110 THERAPEUTIC EXERCISES: CPT | Mod: CQ

## 2025-01-15 PROCEDURE — 97112 NEUROMUSCULAR REEDUCATION: CPT | Mod: CQ

## 2025-01-15 NOTE — PROGRESS NOTES
"[facility]   Physical Therapy Treatment Note      Name: Ciera MD Chandrakant  Clinic Number: [id number]    Therapy Diagnosis:   Encounter Diagnoses   Name Primary?    Chronic left shoulder pain Yes    Decreased ROM of left shoulder     Abnormal posture      Physician: Guanakito Dick MD    Visit Date: 1/15/2025    Physician Orders: PT Eval and Treat   Medical Diagnosis from Referral:   M24.812 (ICD-10-CM) - Internal derangement of left shoulder   S40.012D (ICD-10-CM) - Contusion of left shoulder, subsequent encounter   S50.02XD (ICD-10-CM) - Contusion of left elbow, subsequent encounter   S43.402D (ICD-10-CM) - Sprain of left shoulder, unspecified shoulder sprain type, subsequent encounter   Evaluation Date: 1/9/2025  Authorization Period Expiration: 1/2/26  Plan of Care Expiration: 3/9/2025  Visit # / Visits authorized: 1/1, 1/12     Foto  Date  Score    #1/3 1/9/2025 50   #2/3       #3/3             Precautions: Standard     PTA Visit #: 1/5     Time In: 800 am   Time Out: 8:55 am   Total Billable Time: 55 minutes    Subjective     Pt reports: that her shoulder is feeling about the same. Still difficult reaching away and above her body as well as behind her back and up the back.  She was compliant with home exercise program.  Response to previous treatment: no adverse affect  Functional change: none at this time     Pain: *see subjective*   Location:    Objective      Objective Measures updated at progress report unless specified.     Treatment     Ciera received the treatments listed below:      therapeutic exercises to develop strength, endurance, ROM, and flexibility for 15 minutes including:  Pulleys 3 min flex/abd   Wall slides flex/abd 5" 2x10  Wand flexion/ER 3" 2x10   HEP review   ER/IR RTB 20x   IR strap stretch 15x5"    neuromuscular re-education activities to improve: Coordination, Proprioception, Posture, and Motor control for 40 minutes. The following activities were " "included:  Scapular retraction 3" 2x10   Cervical retractions into pillow 3" 2x10   Shld isometric (ER/IR/abd/flex/ext) 5" x10   Weight shifts @ EOM  Shld taps @ EOM   Wall circles 30x cw/ccw     therapeutic activities to improve functional performance for 0  minutes, including:        Patient Education and Home Exercises       Education provided:   - HEP    Written Home Exercises Provided: yes. Exercises were reviewed and Ciera was able to demonstrate them prior to the end of the session.  Ciera demonstrated good  understanding of the education provided. See EMR under Patient Instructions for exercises provided during therapy sessions    Assessment     Patient continues with L shoulder pain mostly with outward reaching. Posture education provided along with work desk ergonomics. Progressed with IR stretch for better tolerance to functional activities. Also added resisted ER/IR for RTC stability. Cueing to correctly perform isometrics.    Ciera Is progressing well towards her goals.   Pt prognosis is Good.     Pt will continue to benefit from skilled outpatient physical therapy to address the deficits listed in the problem list box on initial evaluation, provide pt/family education and to maximize pt's level of independence in the home and community environment.     Pt's spiritual, cultural and educational needs considered and pt agreeable to plan of care and goals.     Anticipated barriers to physical therapy: none    Goals:   Short Term Goals (4 Weeks):   1. Pt will be independent with HEP to supplement PT in improving functional use of R UE.  2. Pt will increase pain free L shoulder flexion and abduction PROM to at least >150 deg to improve functional mobility of UE  3. Pt will increase L shoulder ER / IR PROM to at least 90/80 deg to improve functional mobility of UE  4. Pt will improve sitting posture without cueing from therapist   Long Term Goals (8 Weeks):   1. Pt will improve FOTO score to >/=65 to decrease " perceived limitation with carrying, moving, and handling objects.  2. Pt will increase L shoulder PROM to WNL in all planes to improve functional use of R RUE.  3. Pt will increase L shoulder AROM to WFL in all planes to improve functional use of R RUE.  4. Pt will improve L shoulder MMTs to = 4+/5 to promote equal use of B UEs in performing functional tasks.  5. Pt will lift 15 lb objects without pain to promote functional QOL.   6. Pt to report pain </= 0/10 with ADLs and IADLs using L UE to improve functional QOL.    Plan     Plan of care Certification: 1/9/2025 to 3/9/2025.     Outpatient Physical Therapy 3 times weekly for 8 weeks to include the following interventions: Cervical/Lumbar Traction, Electrical Stimulation re-eval, dry needling, Gait Training, Iontophoresis (with ), Manual Therapy, Moist Heat/ Ice, Neuromuscular Re-ed, Patient Education, Self Care, Therapeutic Activities, and Therapeutic Exercise.      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      Physical therapist and physical therapy assistant(s) will met face to face to discuss patient's treatment plan and progress towards established goals. Pt will be seen by a physical therapist minimally every 6th visit or every 30 days.    PT/PTA met face to face to discuss pt's treatment plan and progress towards established goals. Pt will be seen by a physical therapist minimally every 6th visit or every 30 days.      Blaine Barney PTA

## 2025-01-16 ENCOUNTER — OFFICE VISIT (OUTPATIENT)
Dept: URGENT CARE | Facility: CLINIC | Age: 70
End: 2025-01-16
Payer: COMMERCIAL

## 2025-01-16 ENCOUNTER — PATIENT MESSAGE (OUTPATIENT)
Dept: REHABILITATION | Facility: HOSPITAL | Age: 70
End: 2025-01-16
Payer: COMMERCIAL

## 2025-01-16 VITALS
DIASTOLIC BLOOD PRESSURE: 70 MMHG | HEIGHT: 64 IN | OXYGEN SATURATION: 98 % | RESPIRATION RATE: 18 BRPM | SYSTOLIC BLOOD PRESSURE: 101 MMHG | BODY MASS INDEX: 18.78 KG/M2 | HEART RATE: 77 BPM | WEIGHT: 110 LBS

## 2025-01-16 DIAGNOSIS — S40.012D CONTUSION OF LEFT SHOULDER, SUBSEQUENT ENCOUNTER: ICD-10-CM

## 2025-01-16 DIAGNOSIS — S43.432A LABRAL TEAR OF SHOULDER, LEFT, INITIAL ENCOUNTER: ICD-10-CM

## 2025-01-16 DIAGNOSIS — Z02.6 ENCOUNTER RELATED TO WORKER'S COMPENSATION CLAIM: Primary | ICD-10-CM

## 2025-01-16 PROCEDURE — 99214 OFFICE O/P EST MOD 30 MIN: CPT | Mod: S$GLB,,, | Performed by: EMERGENCY MEDICINE

## 2025-01-16 NOTE — PROGRESS NOTES
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    Chief Complaint: Arm Injury (LT) and Fall    Patient's place of [place] [employer] [place]  Patient's job title - MD  Date of Injury - 05-28-24  Body part injured - LT Arm, Neck, Back  Current work status per last visit -  Regular Duty  Improved, same, or worse - Same  Pain Scale right now (1-10) -  7/10    Patient reports pain with abduction and reaching behind her back with the left upper extremity.  She did have an MRI showing age-indeterminate labral tear.  She states that she does not take medication as it causes upset stomach and occasionally the methocarbamol causes sedation so she will just take over-the-counter Tylenol.  She has started physical therapy and will continue to do so several times per week.  Discussed that most certainly not likely to be surgical in nature and at this point physical therapy indicated should be very helpful.  She will continue physical therapy return to clinic in 3 weeks.  She is requesting to see me however I am not at the [place] location next month therefore offered to see me in [place] or to return here.    Arm Injury   Associated symptoms include numbness. Pertinent negatives include no chest pain.   Fall  Associated symptoms include numbness. Pertinent negatives include no abdominal pain, fever, hematuria, nausea or vomiting.     ROS  Constitution: Negative for chills, fatigue and fever.   HENT:  Negative for ear pain, sinus pain and sore throat.    Neck: Negative for neck pain and neck stiffness.   Cardiovascular:  Negative for chest pain, palpitations and sob on exertion.   Eyes:  Negative for eye pain and vision loss.   Respiratory:  Negative for cough, shortness of breath and asthma.    Gastrointestinal:  Negative for abdominal pain, nausea, vomiting and diarrhea.   Genitourinary:  Negative for dysuria, frequency and hematuria.   Musculoskeletal:  Positive for pain, joint pain, abnormal ROM of joint and  muscle ache. Negative for trauma, joint swelling, back pain and muscle cramps.   Skin:  Negative for rash and wound.   Allergic/Immunologic: Negative for seasonal allergies and asthma.   Neurological:  Positive for numbness and tingling. Negative for dizziness, light-headedness and altered mental status.   Psychiatric/Behavioral:  Negative for altered mental status and confusion.      Objective:     Physical Exam  Vitals and nursing note reviewed.   Constitutional:       General: She is not in acute distress.     Appearance: Normal appearance. She is well-developed. She is not ill-appearing, toxic-appearing or diaphoretic.   HENT:      Head: Normocephalic and atraumatic. No abrasion, contusion or laceration.      Jaw: No trismus.      Right Ear: Hearing, tympanic membrane, ear canal and external ear normal. No hemotympanum.      Left Ear: Hearing, tympanic membrane, ear canal and external ear normal. No hemotympanum.      Nose: Nose normal. No nasal deformity, mucosal edema or rhinorrhea.      Right Sinus: No maxillary sinus tenderness or frontal sinus tenderness.      Left Sinus: No maxillary sinus tenderness or frontal sinus tenderness.      Mouth/Throat:      Dentition: Normal dentition.      Pharynx: Uvula midline. No posterior oropharyngeal erythema or uvula swelling.   Eyes:      General: Lids are normal. No scleral icterus.        Right eye: No discharge.         Left eye: No discharge.      Conjunctiva/sclera: Conjunctivae normal.      Pupils: Pupils are equal, round, and reactive to light.      Comments: Sclera clear bilat   Neck:      Trachea: Trachea and phonation normal. No tracheal deviation.   Cardiovascular:      Rate and Rhythm: Normal rate and regular rhythm.      Pulses: Normal pulses.      Heart sounds: Normal heart sounds.   Pulmonary:      Effort: Pulmonary effort is normal. No respiratory distress.      Breath sounds: Normal breath sounds.   Abdominal:      General: Bowel sounds are normal.  There is no distension.      Palpations: Abdomen is soft. There is no mass or pulsatile mass.      Tenderness: There is no abdominal tenderness.   Musculoskeletal:         General: Tenderness and signs of injury present. No swelling or deformity.      Cervical back: Full passive range of motion without pain, normal range of motion and neck supple. No rigidity. No spinous process tenderness or muscular tenderness.      Comments: LEFT UPPER EXTREMITY EXAM SHOWS SIGNIFICANT DISCOMFORT WITH INTERNAL AND EXTERNAL ROTATION AS WELL AS ABDUCTION AGAINST RESISTANCE AT 90°.  MILD TENDERNESS TO PALPATION ALONG THE BICEPS AND MEDIAL ELBOW AND DELTOID LATERAL AND POSTERIOR.  NO SIGNIFICANT AC JOINT OR TRAPEZIUS PAIN.  HAS BEEN IMANGED AND NO BONY ABNORMALITY.  FINDINGS CONSISTENT WITH MRI FINDINGS OF LABRAL INJURY/SOURCE   Skin:     General: Skin is warm and dry.      Capillary Refill: Capillary refill takes less than 2 seconds.      Coloration: Skin is not pale.      Findings: No abrasion, bruising, burn, ecchymosis or laceration.   Neurological:      Mental Status: She is alert and oriented to person, place, and time.      GCS: GCS eye subscore is 4. GCS verbal subscore is 5. GCS motor subscore is 6.      Cranial Nerves: No cranial nerve deficit.      Sensory: No sensory deficit.      Motor: No abnormal muscle tone or seizure activity.      Coordination: Coordination normal.   Psychiatric:         Speech: Speech normal.         Behavior: Behavior normal. Behavior is cooperative.         Thought Content: Thought content normal.         Judgment: Judgment normal.        MRI Shoulder Without Contrast Left    Result Date: 1/14/2025  EXAMINATION: MRI SHOULDER WITHOUT CONTRAST LEFT CLINICAL HISTORY: Shoulder trauma, labral tear suspected, xray done;Shoulder trauma, rotator cuff tear suspected, xray done;  Other specific joint derangements of left shoulder, not elsewhere classified TECHNIQUE: Multiplanar multisequence MRI examination  "of LEFT shoulder. COMPARISON: None. FINDINGS: ROTATOR CUFF: Supraspinatus: Intact.  No tendinosis. Infraspinatus: Intact.  No tendinosis. Subscapularis: Intact.  No tendinosis. Teres Minor: Intact.  No tendinosis. CSA (critical shoulder angle): There is minimal physiologic  fluid within the subacromial/subdeltoid bursa. LABRUM: Superior labrum appears grossly intact on this standard non arthrogram exam.Posterior aspect of superior labrum ( "peel-back" location) appears intact. Posterior inferior labral capsular irregularity noted (axial series 4, image 17)..Anterior inferior labrum demonstrates age-indeterminate injury with likely tear and stripping of the anterior capsule best identified sagittal image 13 axial image 16. IGHL:Intact. LONG HEAD BICEPS TENDON: Located within bicipital groove and intact.Biceps-labral anchor is intact. No tendinosis.  No tenosynovitis. Rotator Interval is normal. Biceps pulley is intact. BONES: No evident fracture.Visualized marrow within normal limits. AC joint demonstrates normal alignment with no significant hypertrophy.No significant osteo-acromial outlet narrowing.  There is no evident os acromiale. CARTILAGE: Humeral head and glenoid cartilage preserved without focal defects. No subchondral marrow edema.  No synovial abnormality or intra-articular loose bodies. Glenoid fossa demonstrates no sclerosis. MUSCLES:  Normal bulk and signal.     No evidence for occult osseous injury or rotator cuff tear. Age-indeterminate irregularity of the anterior inferior labrum with labral capsular irregularity posterior labrum mid equator level.  As above. Electronically signed by: Jt Schultz MD Date:    01/14/2025 Time:    08:37     Assessment:      1. Encounter related to worker's compensation claim    2. Labral tear of shoulder, left, initial encounter    3. Contusion of left shoulder, subsequent encounter      Plan:     Patient reports pain with abduction and reaching behind her back with " the left upper extremity. She did have an MRI showing age-indeterminate labral tear. She states that she does not take medication as it causes upset stomach and occasionally the methocarbamol causes sedation so she will just take over-the-counter Tylenol. She has started physical therapy and will continue to do so several times per week. Discussed that most certainly not likely to be surgical in nature and at this point physical therapy indicated should be very helpful. She will continue physical therapy return to clinic in 3 weeks. She is requesting to see me however I am not at the [place] location next month therefore offered to see me in [place] or to return here.      Patient Instructions: Attention not to aggravate affected area, Daily home exercises/warm soaks, Continue Physical Therapy   Restrictions: Regular Duty  Follow up in about 3 weeks (around 2/6/2025) for requesting to see Dr. Dick..

## 2025-01-16 NOTE — LETTER
[facility]  [address] [place] [address]  Phone: [phone]  Fax: [phone]  [facility] [employer]: [email or web address]    Pt Name: Ciera Stallings MD  Injury Date: 05/28/2024   Employee ID: [id number] Date of Treatment: 01/16/2025   Company: [employer]      Appointment Time: 09:15 AM Arrived: 9:05 AM   Provider: Guanakito Dick MD Time Out:9:55 AM      Office Treatment:   1. Encounter related to worker's compensation claim    2. Labral tear of shoulder, left, initial encounter    3. Contusion of left shoulder, subsequent encounter          Patient Instructions: Attention not to aggravate affected area, Daily home exercises/warm soaks, Continue Physical Therapy      Restrictions: Regular Duty     Return Appointment: 2/6/2025 at 8:30 AM [facility] [place]

## 2025-01-17 ENCOUNTER — CLINICAL SUPPORT (OUTPATIENT)
Dept: AUDIOLOGY | Facility: CLINIC | Age: 70
End: 2025-01-17
Payer: COMMERCIAL

## 2025-01-17 DIAGNOSIS — H90.3 SENSORINEURAL HEARING LOSS (SNHL), BILATERAL: Primary | ICD-10-CM

## 2025-01-17 PROCEDURE — 99499 UNLISTED E&M SERVICE: CPT | Mod: S$GLB,,,

## 2025-01-17 NOTE — PROGRESS NOTES
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  DrPrecious Stallings has a mild to moderate sensorineural hearing loss (SNHL), bilaterally. We discussed the patient's hearing loss and how it impacts her daily life in detail. Patient stated that she is mostly in quiet listening environments, but occasionally in background noise. Hearing aid options and recommendations were presented and discussed. Dr. Stallings selected a pair of Phonak [employer] I30-R hearing aids in sand beige with size 0/1 receivers and medium open domes. She opted for the bundled care model. A hearing aid order form was completed and signed.  Patient acknowledged understanding of the 30 day trial period, $250 restocking fee from the time of order, and the fact that we do not file insurance on behalf of the patient.      Recommendations:  Return for hearing aid evaluation on 01/29/2025

## 2025-01-27 ENCOUNTER — PATIENT MESSAGE (OUTPATIENT)
Dept: AUDIOLOGY | Facility: CLINIC | Age: 70
End: 2025-01-27
Payer: COMMERCIAL

## 2025-01-29 ENCOUNTER — CLINICAL SUPPORT (OUTPATIENT)
Dept: AUDIOLOGY | Facility: CLINIC | Age: 70
End: 2025-01-29
Payer: COMMERCIAL

## 2025-01-29 ENCOUNTER — CLINICAL SUPPORT (OUTPATIENT)
Dept: REHABILITATION | Facility: HOSPITAL | Age: 70
End: 2025-01-29
Payer: COMMERCIAL

## 2025-01-29 DIAGNOSIS — R29.3 ABNORMAL POSTURE: ICD-10-CM

## 2025-01-29 DIAGNOSIS — M25.612 DECREASED ROM OF LEFT SHOULDER: ICD-10-CM

## 2025-01-29 DIAGNOSIS — M25.512 CHRONIC LEFT SHOULDER PAIN: Primary | ICD-10-CM

## 2025-01-29 DIAGNOSIS — G89.29 CHRONIC LEFT SHOULDER PAIN: Primary | ICD-10-CM

## 2025-01-29 DIAGNOSIS — H90.3 SENSORINEURAL HEARING LOSS (SNHL), BILATERAL: Primary | ICD-10-CM

## 2025-01-29 PROCEDURE — 97112 NEUROMUSCULAR REEDUCATION: CPT

## 2025-01-29 PROCEDURE — 97110 THERAPEUTIC EXERCISES: CPT

## 2025-01-29 NOTE — PROGRESS NOTES
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 She was fit with the following devices:      Invoice Date: 2025  Phonak i30-R  Aleida Rivas  RT SN: [id number]  LT SN: [id number]   6.0: M1  Dome: Medium Open   SN: [id number]  Warranty Exp: 02/15/2028  Bundled Package    The fit of the hearing aids is good and Dr. Stallings reported good subjective benefit. Proper care and maintenance was discussed. The hearing aid purchase agreement was reviewed and signed. She acknowledged understanding of the 30 day trial period (which concludes on 2025), $250 restocking fee, and the fact that we do not file insurance on behalf of the patient. Daily and consistent use were discussed with Dr. Stallings as ways to best facilitate acclimatization to hearing aids. Hearing aid acclimatization was set at 90% of prescriptive gain with auto-acclimation turned on over the course of 14 days. Dr. Stallings was notified that her hearing aid repair and loss warranty will  on 02/15/2025. When the order was placed, I intended to order Dr. Stallings a ChargeGo with built-in battery backup. I will order one from [employer] and instructed Dr. Stallings to bring her hearing aid  to her two week follow-up to exchange them. Dr. Stallings expressed understanding and agreed to the plan.    Recommendations:  Daily and consistent use of binaural amplification  Follow-up in two weeks to assess current settings  Hearing protection in noise

## 2025-01-29 NOTE — PROGRESS NOTES
[facility]   Physical Therapy Treatment Note      Name: Ciera Stallings MD  Clinic Number: [id number]    Therapy Diagnosis:   Encounter Diagnoses   Name Primary?    Chronic left shoulder pain Yes    Decreased ROM of left shoulder     Abnormal posture        Physician: Guanakito Dick MD    Visit Date: 1/29/2025    Physician Orders: PT Eval and Treat   Medical Diagnosis from Referral:   M24.812 (ICD-10-CM) - Internal derangement of left shoulder   S40.012D (ICD-10-CM) - Contusion of left shoulder, subsequent encounter   S50.02XD (ICD-10-CM) - Contusion of left elbow, subsequent encounter   S43.402D (ICD-10-CM) - Sprain of left shoulder, unspecified shoulder sprain type, subsequent encounter   Evaluation Date: 1/9/2025  Authorization Period Expiration: 1/2/26  Plan of Care Expiration: 3/9/2025  Visit # / Visits authorized: 1/1, 2/12     Foto  Date  Score    #1/3 1/9/2025 50   #2/3       #3/3             Precautions: Standard     PTA Visit #: 1/5     Time In: 800 am   Time Out: 8:55 am   Total Billable Time: 55 minutes    Subjective     Pt reports: she feels about the same since last session.       She was compliant with home exercise program.  Response to previous treatment: no adverse affect  Functional change: none at this time     Pain: *see subjective*   Location:    Objective      1/29/2025      Shoulder Right Right Right Left Left Left Pain/Dysfunction with Movement     AROM PROM MMT AROM PROM MMT     Flexion 130 150 4+/5 125 ! 163 ! 4/5  flexion prom tested after wand flexion stretches    Abduction 180 180 4+/5 180 180 4/5 !     Internal rotation 70 75 4+/5 75 80 4/5 !     External Rotation 90 90 4+/5 40 ! 50 ! 4/5 !  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 Partially able          Treatment     Ciera received the treatments listed below:      therapeutic exercises/objective  to develop strength, endurance, ROM, and flexibility for 40 minutes including:      Pulleys 3 min flex/abd/IR   Wall slides flex/abd 5" 2x10  Wand flexion 10s" x10  - 2#  Wand ER 10s x 10   ER/IR GTB 30x      HEP review         neuromuscular re-education activities to improve: Coordination, Proprioception, Posture, and Motor control for 15 minutes. The following activities were included:    Scapular retraction 3" 2x10   Rows 13lbs 3x10   Shld taps @ EOM   Wall circles 30x cw/ccw     Not today   Cervical retractions into pillow 3" 2x10   Shld isometric (ER/IR/abd/flex/ext) 5" x10   Weight shifts @ EOM      therapeutic activities to improve functional performance for 0  minutes, including:        Patient Education and Home Exercises       Education provided:   - HEP    Written Home Exercises Provided: yes. Exercises were reviewed and Ciera was able to demonstrate them prior to the end of the session.  Ciera demonstrated good  understanding of the education provided. See EMR under Patient Instructions for exercises provided during therapy sessions    Assessment     Patient presents to PT with improved shoulder range of motion compared to last session. Patient did well with progressions. Patient would benefit from ordering a pulley system for at home to work on her shoulder range of motion. Patient would benefit from continued therapy.       Ciera Is progressing well towards her goals.   Pt prognosis is Good.     Pt will continue to benefit from skilled outpatient physical therapy to address the " deficits listed in the problem list box on initial evaluation, provide pt/family education and to maximize pt's level of independence in the home and community environment.     Pt's spiritual, cultural and educational needs considered and pt agreeable to plan of care and goals.     Anticipated barriers to physical therapy: none    Goals:   Short Term Goals (4 Weeks):   1. Pt will be independent with HEP to supplement PT in improving functional use of R UE.  2. Pt will increase pain free L shoulder flexion and abduction PROM to at least >150 deg to improve functional mobility of UE  3. Pt will increase L shoulder ER / IR PROM to at least 90/80 deg to improve functional mobility of UE  4. Pt will improve sitting posture without cueing from therapist   Long Term Goals (8 Weeks):   1. Pt will improve FOTO score to >/=65 to decrease perceived limitation with carrying, moving, and handling objects.  2. Pt will increase L shoulder PROM to WNL in all planes to improve functional use of R RUE.  3. Pt will increase L shoulder AROM to WFL in all planes to improve functional use of R RUE.  4. Pt will improve L shoulder MMTs to = 4+/5 to promote equal use of B UEs in performing functional tasks.  5. Pt will lift 15 lb objects without pain to promote functional QOL.   6. Pt to report pain </= 0/10 with ADLs and IADLs using L UE to improve functional QOL.    Plan     Plan of care Certification: 1/9/2025 to 3/9/2025.     Outpatient Physical Therapy 3 times weekly for 8 weeks to include the following interventions: Cervical/Lumbar Traction, Electrical Stimulation re-eval, dry needling, Gait Training, Iontophoresis (with ), Manual Therapy, Moist Heat/ Ice, Neuromuscular Re-ed, Patient Education, Self Care, Therapeutic Activities, and Therapeutic Exercise.      Upon discharge from further skilled PT intervention it will determined if the need for a work conditioning program or Functional Capacity Evaluation is required to allow the  injured worker to return to work with full potential achieved, continued improvement with body mechanics with advanced functional activities, and further minimize future work-related injuries.      Physical therapist and physical therapy assistant(s) will met face to face to discuss patient's treatment plan and progress towards established goals. Pt will be seen by a physical therapist minimally every 6th visit or every 30 days.    PT/PTA met face to face to discuss pt's treatment plan and progress towards established goals. Pt will be seen by a physical therapist minimally every 6th visit or every 30 days.      Stevo Holcomb, PT, DPT          not oriented to person, place, time or situation

## 2025-01-30 ENCOUNTER — TELEPHONE (OUTPATIENT)
Dept: AUDIOLOGY | Facility: CLINIC | Age: 70
End: 2025-01-30
Payer: COMMERCIAL

## 2025-01-31 ENCOUNTER — CLINICAL SUPPORT (OUTPATIENT)
Dept: REHABILITATION | Facility: HOSPITAL | Age: 70
End: 2025-01-31
Payer: COMMERCIAL

## 2025-01-31 DIAGNOSIS — G89.29 CHRONIC LEFT SHOULDER PAIN: Primary | ICD-10-CM

## 2025-01-31 DIAGNOSIS — R29.3 ABNORMAL POSTURE: ICD-10-CM

## 2025-01-31 DIAGNOSIS — M25.612 DECREASED ROM OF LEFT SHOULDER: ICD-10-CM

## 2025-01-31 DIAGNOSIS — M25.512 CHRONIC LEFT SHOULDER PAIN: Primary | ICD-10-CM

## 2025-01-31 PROCEDURE — 97112 NEUROMUSCULAR REEDUCATION: CPT

## 2025-01-31 PROCEDURE — 97110 THERAPEUTIC EXERCISES: CPT

## 2025-01-31 NOTE — PROGRESS NOTES
[facility]   Physical Therapy Treatment Note      Name: Ciera Stallings MD  Clinic Number: [id number]    Therapy Diagnosis:   Encounter Diagnoses   Name Primary?    Chronic left shoulder pain Yes    Decreased ROM of left shoulder     Abnormal posture          Physician: Guanakito Dick MD    Visit Date: 1/31/2025    Physician Orders: PT Eval and Treat   Medical Diagnosis from Referral:   M24.812 (ICD-10-CM) - Internal derangement of left shoulder   S40.012D (ICD-10-CM) - Contusion of left shoulder, subsequent encounter   S50.02XD (ICD-10-CM) - Contusion of left elbow, subsequent encounter   S43.402D (ICD-10-CM) - Sprain of left shoulder, unspecified shoulder sprain type, subsequent encounter   Evaluation Date: 1/9/2025  Authorization Period Expiration: 1/2/26  Plan of Care Expiration: 3/9/2025  Visit # / Visits authorized: 1/1, 2/12     Foto  Date  Score    #1/3 1/9/2025 50   #2/3       #3/3             Precautions: Standard     PTA Visit #: 0/5     Time In: 800 am   Time Out: 8:55 am   Total Billable Time: 55 minutes    Subjective     Pt reports: she was a little sore after last session.        She was compliant with home exercise program.  Response to previous treatment: no adverse affect  Functional change: none at this time     Pain: *see subjective*   Location:    Objective      1/29/2025      Shoulder Right Right Right Left Left Left Pain/Dysfunction with Movement     AROM PROM MMT AROM PROM MMT     Flexion 130 150 4+/5 125 ! 163 ! 4/5  flexion prom tested after wand flexion stretches    Abduction 180 180 4+/5 180 180 4/5 !     Internal rotation 70 75 4+/5 75 80 4/5 !     External Rotation 90 90 4+/5 40 ! 50 ! 4/5 !  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 Partially able          Treatment     Ciera received the treatments listed below:      therapeutic exercises/objective  to develop strength, endurance, ROM, and flexibility for 40 minutes including:    UBE 3/3  Pulleys 3 min flex/abd/IR   Wall slides flex/abd 5" 2x10  Wand flexion 10s" x10  - 2#  Wand ER 10s x 10   ER/IR GTB 30x   Standing scaption 2lbs 3x10      HEP review         neuromuscular re-education activities to improve: Coordination, Proprioception, Posture, and Motor control for 15 minutes. The following activities were included:    Scapular retraction 3" 2x10   Rows 13lbs 3x10   Shld taps @ EOM   Wall circles 30x cw/ccw     Not today   Cervical retractions into pillow 3" 2x10   Shld isometric (ER/IR/abd/flex/ext) 5" x10   Weight shifts @ EOM      therapeutic activities to improve functional performance for 0  minutes, including:        Patient Education and Home Exercises       Education provided:   - HEP    Written Home Exercises Provided: yes. Exercises were reviewed and Ciera was able to demonstrate them prior to the end of the session.  Ciera demonstrated good  understanding of the education provided. See EMR under Patient Instructions for exercises provided during therapy sessions    Assessment     Patient presents to PT with improved shoulder range of motion compared to last session. Patient did well with progressions. Patient had no adverse effects from today. Patient would benefit from continued therapy.       Ciera Is progressing well towards her goals.   Pt prognosis is Good.     Pt will continue to benefit from skilled outpatient physical therapy to address the deficits listed in the " problem list box on initial evaluation, provide pt/family education and to maximize pt's level of independence in the home and community environment.     Pt's spiritual, cultural and educational needs considered and pt agreeable to plan of care and goals.     Anticipated barriers to physical therapy: none    Goals:   Short Term Goals (4 Weeks):   1. Pt will be independent with HEP to supplement PT in improving functional use of R UE.  2. Pt will increase pain free L shoulder flexion and abduction PROM to at least >150 deg to improve functional mobility of UE  3. Pt will increase L shoulder ER / IR PROM to at least 90/80 deg to improve functional mobility of UE  4. Pt will improve sitting posture without cueing from therapist   Long Term Goals (8 Weeks):   1. Pt will improve FOTO score to >/=65 to decrease perceived limitation with carrying, moving, and handling objects.  2. Pt will increase L shoulder PROM to WNL in all planes to improve functional use of R RUE.  3. Pt will increase L shoulder AROM to WFL in all planes to improve functional use of R RUE.  4. Pt will improve L shoulder MMTs to = 4+/5 to promote equal use of B UEs in performing functional tasks.  5. Pt will lift 15 lb objects without pain to promote functional QOL.   6. Pt to report pain </= 0/10 with ADLs and IADLs using L UE to improve functional QOL.    Plan     Plan of care Certification: 1/9/2025 to 3/9/2025.     Outpatient Physical Therapy 3 times weekly for 8 weeks to include the following interventions: Cervical/Lumbar Traction, Electrical Stimulation re-eval, dry needling, Gait Training, Iontophoresis (with ), Manual Therapy, Moist Heat/ Ice, Neuromuscular Re-ed, Patient Education, Self Care, Therapeutic Activities, and Therapeutic Exercise.      Upon discharge from further skilled PT intervention it will determined if the need for a work conditioning program or Functional Capacity Evaluation is required to allow the injured worker to return  to work with full potential achieved, continued improvement with body mechanics with advanced functional activities, and further minimize future work-related injuries.      Physical therapist and physical therapy assistant(s) will met face to face to discuss patient's treatment plan and progress towards established goals. Pt will be seen by a physical therapist minimally every 6th visit or every 30 days.    PT/PTA met face to face to discuss pt's treatment plan and progress towards established goals. Pt will be seen by a physical therapist minimally every 6th visit or every 30 days.      Stevo Holcomb, PT, DPT

## 2025-02-05 ENCOUNTER — CLINICAL SUPPORT (OUTPATIENT)
Dept: REHABILITATION | Facility: HOSPITAL | Age: 70
End: 2025-02-05
Payer: COMMERCIAL

## 2025-02-05 DIAGNOSIS — R29.3 ABNORMAL POSTURE: ICD-10-CM

## 2025-02-05 DIAGNOSIS — G89.29 CHRONIC LEFT SHOULDER PAIN: Primary | ICD-10-CM

## 2025-02-05 DIAGNOSIS — M25.612 DECREASED ROM OF LEFT SHOULDER: ICD-10-CM

## 2025-02-05 DIAGNOSIS — M25.512 CHRONIC LEFT SHOULDER PAIN: Primary | ICD-10-CM

## 2025-02-05 PROCEDURE — 97110 THERAPEUTIC EXERCISES: CPT

## 2025-02-05 PROCEDURE — 97112 NEUROMUSCULAR REEDUCATION: CPT

## 2025-02-05 NOTE — PROGRESS NOTES
[facility]   Physical Therapy Treatment Note      Name: Ciera Stallings MD  Clinic Number: [id number]    Therapy Diagnosis:   Encounter Diagnoses   Name Primary?    Chronic left shoulder pain Yes    Decreased ROM of left shoulder     Abnormal posture            Physician: Guanakito Dick MD    Visit Date: 2/5/2025    Physician Orders: PT Eval and Treat   Medical Diagnosis from Referral:   M24.812 (ICD-10-CM) - Internal derangement of left shoulder   S40.012D (ICD-10-CM) - Contusion of left shoulder, subsequent encounter   S50.02XD (ICD-10-CM) - Contusion of left elbow, subsequent encounter   S43.402D (ICD-10-CM) - Sprain of left shoulder, unspecified shoulder sprain type, subsequent encounter   Evaluation Date: 1/9/2025  Authorization Period Expiration: 1/2/26  Plan of Care Expiration: 3/9/2025  Visit # / Visits authorized: 1/1, 6/12     Foto  Date  Score    #1/3 1/9/2025 50   #2/3       #3/3             Precautions: Standard     PTA Visit #: 0/5     Time In: 800 am   Time Out: 8:55 am   Total Billable Time: 55 minutes    Subjective     Pt reports: she feels the intensity of the pain may be better but the frequency is the same. she was a little sore after last session.        She was compliant with home exercise program.  Response to previous treatment: no adverse affect  Functional change: none at this time     Pain: *see subjective*   Location:    Objective      1/29/2025      Shoulder Right Right Right Left Left Left Pain/Dysfunction with Movement     AROM PROM MMT AROM PROM MMT     Flexion 130 150 4+/5 125 ! 163 ! 4/5  flexion prom tested after wand flexion stretches    Abduction 180 180 4+/5 180 180 4/5 !     Internal rotation 70 75 4+/5 75 80 4/5 !     External Rotation 90 90 4+/5 40 ! 50 ! 4/5 !  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 Partially able          Treatment     Ciera received the treatments listed below:      therapeutic exercises/objective  to develop strength, endurance, ROM, and flexibility for 40 minutes including:    UBE 3/3  Pulleys 3 min abd and IR   Wall slides flex/abd 5" 2x10  - with lift off for flexion   Sidelying IR stretch 3 min for 10s   ER and IR BTB 3x10   Wand ER 10s x 10     Wand flexion 10s" x10  - 2#  Standing scaption 2lbs 3x10      HEP review         neuromuscular re-education activities to improve: Coordination, Proprioception, Posture, and Motor control for 15 minutes. The following activities were included:    Scapular retraction 3" 2x10   No Money YTB 3x10   Rows 10lbs 3x10 (needed heavy cueing)          Not today   Shld taps @ EOM   Wall circles 30x cw/ccw        therapeutic activities to improve functional performance for 0  minutes, including:        Patient Education and Home Exercises       Education provided:   - HEP    Written Home Exercises Provided: yes. Exercises were reviewed and Ciera was able to demonstrate them prior to the end of the session.  Ciera demonstrated good  understanding of the education provided. See EMR under Patient Instructions for exercises provided during therapy sessions    Assessment     Patient presents to PT with continued difficulty reaching behind her back. Patient continued with TE and NMR to address limitations of ROM and strength. Patient had no adverse effects from today. Patient would benefit from continued therapy.       Ciera Is progressing well towards her goals.   Pt " prognosis is Good.     Pt will continue to benefit from skilled outpatient physical therapy to address the deficits listed in the problem list box on initial evaluation, provide pt/family education and to maximize pt's level of independence in the home and community environment.     Pt's spiritual, cultural and educational needs considered and pt agreeable to plan of care and goals.     Anticipated barriers to physical therapy: none    Goals:   Short Term Goals (4 Weeks):   1. Pt will be independent with HEP to supplement PT in improving functional use of R UE.  2. Pt will increase pain free L shoulder flexion and abduction PROM to at least >150 deg to improve functional mobility of UE  3. Pt will increase L shoulder ER / IR PROM to at least 90/80 deg to improve functional mobility of UE  4. Pt will improve sitting posture without cueing from therapist   Long Term Goals (8 Weeks):   1. Pt will improve FOTO score to >/=65 to decrease perceived limitation with carrying, moving, and handling objects.  2. Pt will increase L shoulder PROM to WNL in all planes to improve functional use of R RUE.  3. Pt will increase L shoulder AROM to WFL in all planes to improve functional use of R RUE.  4. Pt will improve L shoulder MMTs to = 4+/5 to promote equal use of B UEs in performing functional tasks.  5. Pt will lift 15 lb objects without pain to promote functional QOL.   6. Pt to report pain </= 0/10 with ADLs and IADLs using L UE to improve functional QOL.    Plan     Plan of care Certification: 1/9/2025 to 3/9/2025.     Outpatient Physical Therapy 3 times weekly for 8 weeks to include the following interventions: Cervical/Lumbar Traction, Electrical Stimulation re-eval, dry needling, Gait Training, Iontophoresis (with ), Manual Therapy, Moist Heat/ Ice, Neuromuscular Re-ed, Patient Education, Self Care, Therapeutic Activities, and Therapeutic Exercise.      Upon discharge from further skilled PT intervention it will  determined if the need for a work conditioning program or Functional Capacity Evaluation is required to allow the injured worker to return to work with full potential achieved, continued improvement with body mechanics with advanced functional activities, and further minimize future work-related injuries.      Physical therapist and physical therapy assistant(s) will met face to face to discuss patient's treatment plan and progress towards established goals. Pt will be seen by a physical therapist minimally every 6th visit or every 30 days.    PT/PTA met face to face to discuss pt's treatment plan and progress towards established goals. Pt will be seen by a physical therapist minimally every 6th visit or every 30 days.      Stevo Holcomb, PT, DPT

## 2025-02-06 ENCOUNTER — OFFICE VISIT (OUTPATIENT)
Dept: URGENT CARE | Facility: CLINIC | Age: 70
End: 2025-02-06
Payer: COMMERCIAL

## 2025-02-06 VITALS
TEMPERATURE: 98 F | HEIGHT: 64 IN | SYSTOLIC BLOOD PRESSURE: 114 MMHG | BODY MASS INDEX: 18.78 KG/M2 | HEART RATE: 74 BPM | WEIGHT: 110 LBS | DIASTOLIC BLOOD PRESSURE: 72 MMHG | OXYGEN SATURATION: 98 % | RESPIRATION RATE: 16 BRPM

## 2025-02-06 DIAGNOSIS — Z02.6 ENCOUNTER RELATED TO WORKER'S COMPENSATION CLAIM: ICD-10-CM

## 2025-02-06 DIAGNOSIS — S40.012D CONTUSION OF LEFT SHOULDER, SUBSEQUENT ENCOUNTER: ICD-10-CM

## 2025-02-06 DIAGNOSIS — S43.432A LABRAL TEAR OF SHOULDER, LEFT, INITIAL ENCOUNTER: Primary | ICD-10-CM

## 2025-02-06 PROCEDURE — 99214 OFFICE O/P EST MOD 30 MIN: CPT | Mod: S$GLB,,, | Performed by: SURGERY

## 2025-02-06 NOTE — PROGRESS NOTES
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    Chief Complaint: Shoulder Injury (Left RHD)    Patient's place of employment - [employer]  Patient's job title - MD  Date of Injury - 05/28/24  Body part injured - Left Shoulder  Current work status per last visit - Light  Improved, same, or worse - Same  Pain Scale right now (1-10) -  7/10    Shoulder Injury   Associated symptoms include numbness. Pertinent negatives include no chest pain.       Constitution: Negative for chills, fatigue and fever.   HENT:  Negative for ear pain, sinus pain and sore throat.    Neck: Negative for neck pain and neck stiffness.   Cardiovascular:  Negative for chest pain, palpitations and sob on exertion.   Eyes:  Negative for eye pain and vision loss.   Respiratory:  Negative for cough, shortness of breath and asthma.    Gastrointestinal:  Negative for abdominal pain, nausea, vomiting and diarrhea.   Genitourinary:  Negative for dysuria, frequency and hematuria.   Musculoskeletal:  Positive for pain, joint pain, abnormal ROM of joint and muscle ache. Negative for trauma, joint swelling, back pain and muscle cramps.   Skin:  Negative for rash and wound.   Allergic/Immunologic: Negative for seasonal allergies and asthma.   Neurological:  Positive for numbness and tingling. Negative for dizziness, light-headedness and altered mental status.   Psychiatric/Behavioral:  Negative for altered mental status and confusion.      See MA note above. 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  She has been attending physical therapy but does not notice an improvement in her pain symptoms.  She has pain with reaching laterally at shoulder level, trying to put on or take off her bra.  Does endorse intermittent numbness and tingling in the hand if she leaves her arm hanging down too long.  She is not using any medications currently to address pain symptoms due  "to medication side effects, the Robaxin caused headaches.    Objective:     Physical Exam  Vitals and nursing note reviewed.   Constitutional:       General: She is not in acute distress.     Appearance: She is not ill-appearing.   HENT:      Head: Normocephalic.   Eyes:      Conjunctiva/sclera: Conjunctivae normal.   Pulmonary:      Effort: Pulmonary effort is normal. No respiratory distress.   Musculoskeletal:      Comments: Lateral upper arm pain with internal rotation and abduction at 90°.   Skin:     General: Skin is warm.      Coloration: Skin is not pale.   Neurological:      General: No focal deficit present.      Mental Status: She is alert and oriented to person, place, and time.      GCS: GCS eye subscore is 4. GCS verbal subscore is 5. GCS motor subscore is 6.      Motor: Motor function is intact.      Coordination: Coordination is intact.   Psychiatric:         Attention and Perception: Attention normal.         Mood and Affect: Mood normal.         Speech: Speech normal.         Behavior: Behavior normal. Behavior is cooperative.         Thought Content: Thought content normal.        Imaging  MRI Shoulder Without Contrast Left    Result Date: 1/14/2025  EXAMINATION: MRI SHOULDER WITHOUT CONTRAST LEFT CLINICAL HISTORY: Shoulder trauma, labral tear suspected, xray done;Shoulder trauma, rotator cuff tear suspected, xray done;  Other specific joint derangements of left shoulder, not elsewhere classified TECHNIQUE: Multiplanar multisequence MRI examination of LEFT shoulder. COMPARISON: None. FINDINGS: ROTATOR CUFF: Supraspinatus: Intact.  No tendinosis. Infraspinatus: Intact.  No tendinosis. Subscapularis: Intact.  No tendinosis. Teres Minor: Intact.  No tendinosis. CSA (critical shoulder angle): There is minimal physiologic  fluid within the subacromial/subdeltoid bursa. LABRUM: Superior labrum appears grossly intact on this standard non arthrogram exam.Posterior aspect of superior labrum ( "peel-back" " location) appears intact. Posterior inferior labral capsular irregularity noted (axial series 4, image 17)..Anterior inferior labrum demonstrates age-indeterminate injury with likely tear and stripping of the anterior capsule best identified sagittal image 13 axial image 16. IGHL:Intact. LONG HEAD BICEPS TENDON: Located within bicipital groove and intact.Biceps-labral anchor is intact. No tendinosis.  No tenosynovitis. Rotator Interval is normal. Biceps pulley is intact. BONES: No evident fracture.Visualized marrow within normal limits. AC joint demonstrates normal alignment with no significant hypertrophy.No significant osteo-acromial outlet narrowing.  There is no evident os acromiale. CARTILAGE: Humeral head and glenoid cartilage preserved without focal defects. No subchondral marrow edema.  No synovial abnormality or intra-articular loose bodies. Glenoid fossa demonstrates no sclerosis. MUSCLES:  Normal bulk and signal.     No evidence for occult osseous injury or rotator cuff tear. Age-indeterminate irregularity of the anterior inferior labrum with labral capsular irregularity posterior labrum mid equator level.  As above. Electronically signed by: Jt Schultz MD Date:    01/14/2025 Time:    08:37     Assessment:      1. Labral tear of shoulder, left, initial encounter    2. Encounter related to worker's compensation claim    3. Contusion of left shoulder, subsequent encounter      Plan:     I reviewed the PT notes related to this injury. We discussed the MRI findings, discussed treatment options, she would like second opinion from Ortho. Referral sent. Continue PT. Regular Duty.        Diagnoses and plan discussed with the patient, all questions and concerns were addressed prior to discharge. Follow-up as needed if any reoccurrence of symptoms related to the present condition. Plan was developed with active input from the patient and they verbalized understanding of and agreement with the POC.   Note was  dictated with voice recognition software, please excuse any grammatical errors.    I spent a total of 30 minutes on the day of the visit.  This includes face to face time and non-face to face time preparing to see the patient (eg, review of tests), obtaining and/or reviewing separately obtained history, documenting clinical information in the electronic or other health record, independently interpreting results and communicating results to the patient/family/caregiver, or care coordinator.    Patient Instructions: Referral to specialist to be scheduled, once authorized   Restrictions: Regular Duty, Discharged to Ortho/Neuro/Opthomologist/Surgeon  Follow up if symptoms worsen or fail to improve.

## 2025-02-06 NOTE — LETTER
[facility]  [address] [address]  Phone: [phone]  Fax: [phone]  [facility] [employer]: [email or web address]    Pt Name: Ciera Stallings MD  Injury Date: 05/28/2024   Employee ID: [id number] Date of Treatment: 02/06/2025   Company: [employer]      Appointment Time: 08:30 AM Arrived: 8:24 AM    Provider: Mariajose Mott MD Time Out:8:52 AM      Office Treatment:   1. Labral tear of shoulder, left, initial encounter    2. Encounter related to worker's compensation claim    3. Contusion of left shoulder, subsequent encounter          Patient Instructions: Referral to specialist to be scheduled, once authorized      Restrictions: Regular Duty, Discharged to Ortho/Neuro/Opthomologist/Surgeon     Return As needed. KENRICK

## 2025-02-07 ENCOUNTER — CLINICAL SUPPORT (OUTPATIENT)
Dept: REHABILITATION | Facility: HOSPITAL | Age: 70
End: 2025-02-07
Payer: COMMERCIAL

## 2025-02-07 DIAGNOSIS — G89.29 CHRONIC LEFT SHOULDER PAIN: Primary | ICD-10-CM

## 2025-02-07 DIAGNOSIS — M25.512 CHRONIC LEFT SHOULDER PAIN: Primary | ICD-10-CM

## 2025-02-07 DIAGNOSIS — M25.612 DECREASED ROM OF LEFT SHOULDER: ICD-10-CM

## 2025-02-07 DIAGNOSIS — R29.3 ABNORMAL POSTURE: ICD-10-CM

## 2025-02-07 PROCEDURE — 97112 NEUROMUSCULAR REEDUCATION: CPT | Mod: CQ

## 2025-02-07 PROCEDURE — 97110 THERAPEUTIC EXERCISES: CPT | Mod: CQ

## 2025-02-07 NOTE — PROGRESS NOTES
[facility]   Physical Therapy Treatment Note      Name: Ciera Stallings MD  Clinic Number: [id number]    Therapy Diagnosis:   Encounter Diagnoses   Name Primary?    Chronic left shoulder pain Yes    Decreased ROM of left shoulder     Abnormal posture            Physician: Guanakito Dick MD    Visit Date: 2/7/2025    Physician Orders: PT Eval and Treat   Medical Diagnosis from Referral:   M24.812 (ICD-10-CM) - Internal derangement of left shoulder   S40.012D (ICD-10-CM) - Contusion of left shoulder, subsequent encounter   S50.02XD (ICD-10-CM) - Contusion of left elbow, subsequent encounter   S43.402D (ICD-10-CM) - Sprain of left shoulder, unspecified shoulder sprain type, subsequent encounter   Evaluation Date: 1/9/2025  Authorization Period Expiration: 1/2/26  Plan of Care Expiration: 3/9/2025  Visit # / Visits authorized: 1/1, 7/12     Foto  Date  Score    #1/3 1/9/2025 50   #2/3       #3/3             Precautions: Standard     PTA Visit #: 1/5     Time In: 800 am   Time Out: 8:55 am   Total Billable Time: 55 minutes    Subjective     Pt reports: she feels about the same. She is going to see an orthopedist soon.      She was compliant with home exercise program.  Response to previous treatment: no adverse affect  Functional change: none at this time     Pain: 6/10  Location:    Objective      1/29/2025      Shoulder Right Right Right Left Left Left Pain/Dysfunction with Movement     AROM PROM MMT AROM PROM MMT     Flexion 130 150 4+/5 125 ! 163 ! 4/5  flexion prom tested after wand flexion stretches    Abduction 180 180 4+/5 180 180 4/5 !     Internal rotation 70 75 4+/5 75 80 4/5 !     External Rotation 90 90 4+/5 40 ! 50 ! 4/5 !  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 Partially able          Treatment     Ciera received the treatments listed below:      therapeutic exercises/objective  to develop strength, endurance, ROM, and flexibility for 40 minutes including:    UBE 3/3  Pulleys 3 min abd/flex and IR   Wall slides flex/abd 5" 2x10  - with lift off for flexion   Sidelying IR stretch 3 min for 10s   ER and IR BTB 3x10   Wand ER 10s x 10   Wand flexion 10s" x10  - 2#  Standing scaption 2lbs 3x10   Kinesio tape inferior spinatus to deltoid and ant deltoid to medial scap   HEP review         neuromuscular re-education activities to improve: Coordination, Proprioception, Posture, and Motor control for 15 minutes. The following activities were included:    Scapular retraction 3" 2x10   No Money YTB 3x10   Rows 10lbs 3x10 (needed heavy cueing)          Not today   Shld taps @ EOM   Wall circles 30x cw/ccw        therapeutic activities to improve functional performance for 0  minutes, including:        Patient Education and Home Exercises       Education provided:   - HEP    Written Home Exercises Provided: yes. Exercises were reviewed and Ciera was able to demonstrate them prior to the end of the session.  Ciera demonstrated good  understanding of the education provided. See EMR under Patient Instructions for exercises provided during therapy sessions    Assessment   Patient continued with TE and NMR to address limitations of ROM and strength. Trial of kinesio tape for pain control and stability. Patient had no adverse effects from today. Patient would benefit from continued therapy.       Ciera Is progressing well towards her goals.   Pt prognosis is Good. "     Pt will continue to benefit from skilled outpatient physical therapy to address the deficits listed in the problem list box on initial evaluation, provide pt/family education and to maximize pt's level of independence in the home and community environment.     Pt's spiritual, cultural and educational needs considered and pt agreeable to plan of care and goals.     Anticipated barriers to physical therapy: none    Goals:   Short Term Goals (4 Weeks):   1. Pt will be independent with HEP to supplement PT in improving functional use of R UE.  2. Pt will increase pain free L shoulder flexion and abduction PROM to at least >150 deg to improve functional mobility of UE  3. Pt will increase L shoulder ER / IR PROM to at least 90/80 deg to improve functional mobility of UE  4. Pt will improve sitting posture without cueing from therapist   Long Term Goals (8 Weeks):   1. Pt will improve FOTO score to >/=65 to decrease perceived limitation with carrying, moving, and handling objects.  2. Pt will increase L shoulder PROM to WNL in all planes to improve functional use of R RUE.  3. Pt will increase L shoulder AROM to WFL in all planes to improve functional use of R RUE.  4. Pt will improve L shoulder MMTs to = 4+/5 to promote equal use of B UEs in performing functional tasks.  5. Pt will lift 15 lb objects without pain to promote functional QOL.   6. Pt to report pain </= 0/10 with ADLs and IADLs using L UE to improve functional QOL.    Plan     Plan of care Certification: 1/9/2025 to 3/9/2025.     Outpatient Physical Therapy 3 times weekly for 8 weeks to include the following interventions: Cervical/Lumbar Traction, Electrical Stimulation re-eval, dry needling, Gait Training, Iontophoresis (with ), Manual Therapy, Moist Heat/ Ice, Neuromuscular Re-ed, Patient Education, Self Care, Therapeutic Activities, and Therapeutic Exercise.      Upon discharge from further skilled PT intervention it will determined if the need for a  work conditioning program or Functional Capacity Evaluation is required to allow the injured worker to return to work with full potential achieved, continued improvement with body mechanics with advanced functional activities, and further minimize future work-related injuries.      Physical therapist and physical therapy assistant(s) will met face to face to discuss patient's treatment plan and progress towards established goals. Pt will be seen by a physical therapist minimally every 6th visit or every 30 days.    PT/PTA met face to face to discuss pt's treatment plan and progress towards established goals. Pt will be seen by a physical therapist minimally every 6th visit or every 30 days.      Blaine Barney, PTA

## 2025-02-10 ENCOUNTER — CLINICAL SUPPORT (OUTPATIENT)
Dept: AUDIOLOGY | Facility: CLINIC | Age: 70
End: 2025-02-10
Payer: COMMERCIAL

## 2025-02-10 DIAGNOSIS — H90.3 SENSORINEURAL HEARING LOSS (SNHL), BILATERAL: Primary | ICD-10-CM

## 2025-02-10 PROCEDURE — 99499 UNLISTED E&M SERVICE: CPT | Mod: S$GLB,,,

## 2025-02-10 NOTE — PROGRESS NOTES
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 She is currently fit with the following devices:      Invoice Date: 01/17/2025  Phonak i30-R  Aleida Rivas  RT SN: [id number]  LT SN: [id number]   6.0: M1  Dome: Medium Open   Case Go SN: [id number]   Warranty Exp: 02/15/2028  Bundled Package    Dr. Stallings reported she is doing well with her hearing aids and her battery life has been sufficient to last all day. Dr. Stallings brought in her standard issue  (SN: [id number]) and exchanged it for a  Case Go (SN:[id number]). Regarding the hearing aids, she reported several fit issues. She reported that when she puts on her glasses, they are not fitting well with the hearing aids. She also reported at times she wears both regular glasses and sunglasses, which can be very heavy on the top of her ears while wearing her hearing aids. She also reported feeling that the wire on the left side was too short, as the hearing aid keeps popping off the back of her ear.     Dr. Stallings inquired about custom devices, as they would better fit her needs. Dr. Stallings would like to order custom amplification and selected a pair of [employer] Virto I30-R hearing aids in [place]. Otoscopic inspection was unremarkable. Ear impressions were taken today without incident and the Virto I30-R hearing aids will be ordered. The patient was notified that the hearing aids may take about 3 weeks to arrive in clinic. She has been scheduled for a new hearing aid fitting visit and we discussed that we will review the cleaning and maintenance schedule when she comes. Dr. Stallings returned both hearing aids and both chargers to me today. Her returns will be sent to [employer] with her earmold impressions for credit.    Recommendations:  Otologic evaluation  Return for hearing aid fitting on 03/05/2025  Annual audiologic evaluation or sooner if change  perceived  Hearing protection in noise

## 2025-02-12 ENCOUNTER — CLINICAL SUPPORT (OUTPATIENT)
Dept: REHABILITATION | Facility: HOSPITAL | Age: 70
End: 2025-02-12
Payer: COMMERCIAL

## 2025-02-12 ENCOUNTER — PATIENT MESSAGE (OUTPATIENT)
Dept: SPORTS MEDICINE | Facility: CLINIC | Age: 70
End: 2025-02-12
Payer: COMMERCIAL

## 2025-02-12 DIAGNOSIS — M25.512 CHRONIC LEFT SHOULDER PAIN: Primary | ICD-10-CM

## 2025-02-12 DIAGNOSIS — G89.29 CHRONIC LEFT SHOULDER PAIN: Primary | ICD-10-CM

## 2025-02-12 DIAGNOSIS — M25.612 DECREASED ROM OF LEFT SHOULDER: ICD-10-CM

## 2025-02-12 DIAGNOSIS — R29.3 ABNORMAL POSTURE: ICD-10-CM

## 2025-02-12 PROCEDURE — 97140 MANUAL THERAPY 1/> REGIONS: CPT | Mod: CQ

## 2025-02-12 NOTE — PROGRESS NOTES
[facility]   Physical Therapy Treatment Note      Name: Ciera Stallings MD  Clinic Number: [id number]    Therapy Diagnosis:   Encounter Diagnoses   Name Primary?    Chronic left shoulder pain Yes    Decreased ROM of left shoulder     Abnormal posture            Physician: Guanakito Dick MD    Visit Date: 2/12/2025    Physician Orders: PT Eval and Treat   Medical Diagnosis from Referral:   M24.812 (ICD-10-CM) - Internal derangement of left shoulder   S40.012D (ICD-10-CM) - Contusion of left shoulder, subsequent encounter   S50.02XD (ICD-10-CM) - Contusion of left elbow, subsequent encounter   S43.402D (ICD-10-CM) - Sprain of left shoulder, unspecified shoulder sprain type, subsequent encounter   Evaluation Date: 1/9/2025  Authorization Period Expiration: 1/2/26  Plan of Care Expiration: 3/9/2025  Visit # / Visits authorized: 1/1, 7/12     Foto  Date  Score    #1/3 1/9/2025 50   #2/3       #3/3             Precautions: Standard     PTA Visit #: 1/5     Time In: 800 am   Time Out: 8:16 am   Total Billable Time: 16 minutes    Subjective     Pt reports: she feels the same. She wants to make today her last day in therapy. She states that it did help a little but she continues to have L shoulder pain that radiates down the L arm (deltoid insertion). She is going to see an orthopedist to discuss next option.      She was compliant with home exercise program.  Response to previous treatment: no adverse affect  Functional change: none at this time     Pain: 8/10  Location:    Objective      1/29/2025      Shoulder Right Right Right Left Left Left Pain/Dysfunction with Movement     AROM PROM MMT AROM PROM MMT     Flexion 130 150 4+/5 125 ! 163 ! 4/5  flexion prom tested after wand flexion stretches    Abduction 180 180 4+/5 180 180 4/5 !     Internal rotation 70 75 4+/5 75 80 4/5 !     External Rotation 90 90 4+/5 40 ! 50 ! 4/5 !  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 Partially able          Treatment     Ciera received the treatments listed below:      therapeutic exercises/objective  to develop strength, endurance, ROM, and flexibility for 6 minutes including:      Pulleys 3 min abd/flex and IR     MANUAL THERAPY TECHNIQUES including Soft tissue Mobilization were applied to L shoulder (deltoid) for 10 minutes.      Not performed today:  UBE 3/3  Wall slides flex/abd 5" 2x10  - with lift off for flexion   Sidelying IR stretch 3 min for 10s   ER and IR BTB 3x10   Wand ER 10s x 10   Wand flexion 10s" x10  - 2#  Standing scaption 2lbs 3x10   Kinesio tape inferior spinatus to deltoid and ant deltoid to medial scap   HEP review         neuromuscular re-education activities to improve: Coordination, Proprioception, Posture, and Motor control for 00 minutes. The following activities were included:    Scapular retraction 3" 2x10   No Money YTB 3x10   Rows 10lbs 3x10 (needed heavy cueing)          Not today   Shld taps @ EOM   Wall circles 30x cw/ccw        therapeutic activities to improve functional performance for 0  minutes, including:        Patient Education and Home Exercises       Education provided:   - HEP    Written Home Exercises Provided: yes. Exercises were reviewed and Ciera was able to demonstrate them prior to the end of the session.  Ciera demonstrated good  understanding of the education provided. See EMR under Patient Instructions for exercises provided during " therapy sessions    Assessment   Patient presents to therapy with severe L lateral proximal arm pain. She states that the kinesio tape did not help and it was hard to remove. Trial of IASTM to L deltoid performed with patient stating it was helping with the pain. Multiple TTP trigger points addressed with tool. Pt will concluded PT today and see her Orthopedist.      Ciera Is progressing well towards her goals.   Pt prognosis is Good.    Pt will continue to benefit from skilled outpatient physical therapy to address the deficits listed in the problem list box on initial evaluation, provide pt/family education and to maximize pt's level of independence in the home and community environment.     Pt's spiritual, cultural and educational needs considered and pt agreeable to plan of care and goals.     Anticipated barriers to physical therapy: none    Goals:   Short Term Goals (4 Weeks):   1. Pt will be independent with HEP to supplement PT in improving functional use of R UE.  2. Pt will increase pain free L shoulder flexion and abduction PROM to at least >150 deg to improve functional mobility of UE  3. Pt will increase L shoulder ER / IR PROM to at least 90/80 deg to improve functional mobility of UE  4. Pt will improve sitting posture without cueing from therapist   Long Term Goals (8 Weeks):   1. Pt will improve FOTO score to >/=65 to decrease perceived limitation with carrying, moving, and handling objects.  2. Pt will increase L shoulder PROM to WNL in all planes to improve functional use of R RUE.  3. Pt will increase L shoulder AROM to WFL in all planes to improve functional use of R RUE.  4. Pt will improve L shoulder MMTs to = 4+/5 to promote equal use of B UEs in performing functional tasks.  5. Pt will lift 15 lb objects without pain to promote functional QOL.   6. Pt to report pain </= 0/10 with ADLs and IADLs using L UE to improve functional QOL.    Plan     Plan of care Certification: 1/9/2025 to  3/9/2025.     Outpatient Physical Therapy 3 times weekly for 8 weeks to include the following interventions: Cervical/Lumbar Traction, Electrical Stimulation re-eval, dry needling, Gait Training, Iontophoresis (with ), Manual Therapy, Moist Heat/ Ice, Neuromuscular Re-ed, Patient Education, Self Care, Therapeutic Activities, and Therapeutic Exercise.      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      Physical therapist and physical therapy assistant(s) will met face to face to discuss patient's treatment plan and progress towards established goals. Pt will be seen by a physical therapist minimally every 6th visit or every 30 days.    PT/PTA met face to face to discuss pt's treatment plan and progress towards established goals. Pt will be seen by a physical therapist minimally every 6th visit or every 30 days.      Blaine Barney, PTA

## 2025-02-17 ENCOUNTER — TELEPHONE (OUTPATIENT)
Dept: SPORTS MEDICINE | Facility: CLINIC | Age: 70
End: 2025-02-17
Payer: COMMERCIAL

## 2025-02-17 NOTE — TELEPHONE ENCOUNTER
LVM for patient about appointment scheduled without work comp referral incorrectly. Told to call back to discuss if she wished to see Dr. Beavers for this.

## 2025-02-18 ENCOUNTER — TELEPHONE (OUTPATIENT)
Dept: AUDIOLOGY | Facility: CLINIC | Age: 70
End: 2025-02-18
Payer: COMMERCIAL

## 2025-02-24 ENCOUNTER — CLINICAL SUPPORT (OUTPATIENT)
Dept: AUDIOLOGY | Facility: CLINIC | Age: 70
End: 2025-02-24
Payer: COMMERCIAL

## 2025-02-24 DIAGNOSIS — H90.3 SENSORINEURAL HEARING LOSS (SNHL), BILATERAL: Primary | ICD-10-CM

## 2025-02-24 PROCEDURE — 99499 UNLISTED E&M SERVICE: CPT | Mod: S$GLB,,,

## 2025-02-24 NOTE — PROGRESS NOTES
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 She was fit with the following devices:      Invoice Date: 02/15/2025  Iman MEJIA 12 ITC R  LT SN: [id number]  RT SN: [id number]  Premium   SN: [id number]  Warranty Exp: 2028  Bundled Package    The fit of the hearing aids is good and Dr. Stallings reported good subjective benefit. Proper care and maintenance was discussed. The hearing aid purchase agreement was reviewed and signed. She acknowledged understanding of the 30 day trial period (which concludes on 2025), $250 restocking fee, and the fact that we do not file insurance on behalf of the patient. Daily and consistent use were discussed with Dr. Stallings as ways to best facilitate acclimatization to hearing aids. Hearing aid acclimatization was set at the Familiar level with auto-acclimation turned off. Dr. Stallings was notified that her hearing aid repair and loss warranty will  on 2028. Dr. Stallings expressed understanding and agreed to the plan.    Recommendations:  Daily and consistent use of binaural amplification  Follow-up in two weeks to assess current settings  Hearing protection in noise

## 2025-02-27 ENCOUNTER — OFFICE VISIT (OUTPATIENT)
Dept: SPORTS MEDICINE | Facility: CLINIC | Age: 70
End: 2025-02-27
Payer: COMMERCIAL

## 2025-02-27 ENCOUNTER — HOSPITAL ENCOUNTER (OUTPATIENT)
Dept: RADIOLOGY | Facility: HOSPITAL | Age: 70
Discharge: HOME | End: 2025-02-27
Attending: ORTHOPAEDIC SURGERY
Payer: COMMERCIAL

## 2025-02-27 VITALS — BODY MASS INDEX: 18.92 KG/M2 | WEIGHT: 110.25 LBS

## 2025-02-27 DIAGNOSIS — M67.912 ROTATOR CUFF DISORDER, LEFT: ICD-10-CM

## 2025-02-27 DIAGNOSIS — M75.22 BICEPS TENDINITIS OF LEFT UPPER EXTREMITY: ICD-10-CM

## 2025-02-27 DIAGNOSIS — M25.512 LEFT SHOULDER PAIN, UNSPECIFIED CHRONICITY: ICD-10-CM

## 2025-02-27 DIAGNOSIS — Z02.6 ENCOUNTER RELATED TO WORKER'S COMPENSATION CLAIM: ICD-10-CM

## 2025-02-27 DIAGNOSIS — M25.512 LEFT SHOULDER PAIN, UNSPECIFIED CHRONICITY: Primary | ICD-10-CM

## 2025-02-27 PROCEDURE — 73030 X-RAY EXAM OF SHOULDER: CPT | Mod: 26,LT,, | Performed by: RADIOLOGY

## 2025-02-27 PROCEDURE — 73030 X-RAY EXAM OF SHOULDER: CPT | Mod: TC,LT

## 2025-02-27 PROCEDURE — 99999 PR PBB SHADOW E&M-EST. PATIENT-LVL III: CPT | Mod: PBBFAC,,, | Performed by: ORTHOPAEDIC SURGERY

## 2025-02-27 RX ORDER — MELOXICAM 7.5 MG/1
7.5 TABLET ORAL DAILY
Qty: 30 TABLET | Refills: 0 | Status: SHIPPED | OUTPATIENT
Start: 2025-02-27

## 2025-02-27 RX ORDER — TRIAMCINOLONE ACETONIDE 40 MG/ML
40 INJECTION, SUSPENSION INTRA-ARTICULAR; INTRAMUSCULAR
Status: DISCONTINUED | OUTPATIENT
Start: 2025-02-27 | End: 2025-02-27 | Stop reason: HOSPADM

## 2025-02-27 RX ADMIN — TRIAMCINOLONE ACETONIDE 40 MG: 40 INJECTION, SUSPENSION INTRA-ARTICULAR; INTRAMUSCULAR at 08:02

## 2025-02-27 NOTE — PROGRESS NOTES
CC: LEFT shoulder pain     69 y.o. Female with a 1 year history of left shoulder traumatic pain.  Patient works as a  at [employer]. She had a fall onto the outstretched hand a year ago. She complains of diffuse pain all around the shoulder.   She states that the pain is severe and not responding to any conservative care.      She reports that the pain and weakness is worse with overhead activity. It also bothers her at night.    Is affecting ADLs.  Pain is 8/10 at it's worst. She had done some amount of PT and tried oral pain medications.       Past Medical History:   Diagnosis Date    Benign essential hypertension 01/16/2023    MARQUITA (generalized anxiety disorder) 01/16/2023    Hyperlipidemia associated with type 2 diabetes mellitus 06/06/2024    Subclinical hypothyroidism 01/16/2023    Type 2 diabetes mellitus without retinopathy 12/29/2013       Past Surgical History:   Procedure Laterality Date    APPENDECTOMY N/A     ELBOW FRACTURE SURGERY Right     HIP FRACTURE SURGERY Left        Family History   Problem Relation Name Age of Onset    Diabetes Mother jorge l     Hypertension Mother jorge l     Cancer Father AVS Sastry     Diabetes Sister majo,     Hearing loss Sister maoj,     Diabetes Brother alberto kang        Current Medications[1]    Review of patient's allergies indicates:  No Known Allergies       REVIEW OF SYSTEMS:  Constitution: Negative. Negative for chills, fever and night sweats.   HENT: Negative for congestion and headaches.    Eyes: Negative for blurred vision, left vision loss and right vision loss.   Cardiovascular: Negative for chest pain and syncope.   Respiratory: Negative for cough and shortness of breath.    Endocrine: Negative for polydipsia, polyphagia and polyuria.   Hematologic/Lymphatic: Negative for bleeding problem. Does not bruise/bleed easily.   Skin: Negative for dry skin, itching and rash.   Musculoskeletal: Negative for falls.  Positive for left shoulder  pain and muscle weakness.   Gastrointestinal: Negative for abdominal pain and bowel incontinence.   Genitourinary: Negative for bladder incontinence and nocturia.   Neurological: Negative for disturbances in coordination, loss of balance and seizures.   Psychiatric/Behavioral: Negative for depression. The patient does not have insomnia.    Allergic/Immunologic: Negative for hives and persistent infections.      PHYSICAL EXAMINATION:  Vitals:  Wt 50 kg (110 lb 3.7 oz)   BMI 18.92 kg/m²    General: The patient is alert and oriented x 3.  Mood is pleasant.  Observation of ears, eyes and nose reveal no gross abnormalities.  No labored breathing observed.  Gait is coordinated. Patient can toe walk and heel walk without difficulty.      LEFT Shoulder / Upper Extremity Exam    OBSERVATION:     Swelling  none  Deformity  none   Discoloration  none   Scapular winging none   Scars   none  Atrophy  none    TENDERNESS / CREPITUS (T/C):          T/C      T/C   Clavicle   -/-  SUPRAspinatus    +/-     AC Jt.    -/-  INFRAspinatus  -/-    SC Jt.    -/-  Deltoid    -/-      G. Tuberosity  -/-  LH BICEP groove  +/-   Acromion:  -/-  Midline Neck   -/-     Scapular Spine -/-  Trapezium   -/-   SMA Scapula  -/-  GH jt. line - post  -/-     Scapulothoracic  -/-         ROM: (* = with pain)  Right shoulder   Left shoulder        AROM (PROM)   AROM (PROM)   FE    170° (175°)     *130° (140°)     ER at 0°    60°  (65°)    *50°  (65°)   ER at 90° ABD  90°  (90°)    *70°  (70°)   IR at 90°  ABD   NA  (40°)     NA  (40°)      IR (spine level)   T10     T10    STRENGTH: (* = with pain) Right shoulder   Left shoulder    SCAPTION   5/5    4/5    IR    5/5    5/5   ER    5/5    5/5   BICEPS   5/5    5/5   Deltoid    5/5    5/5     SIGNS:  Painful side       NEER   -    OMILESS  +   HOFF   -    SPEEDS  +     DROP ARM   -   BELLY PRESS +   Superior escape none    LIFT-OFF  neg   X-Body ADD    neg    MOVING VALGUS neg        STABILITY  TESTING    Right shoulder   Left shoulder    Translation     Anterior  up face     up face    Posterior  up face    up face    Sulcus   < 10mm    < 10 mm     Signs   Apprehension   neg      neg       Relocation   no change     no change      Jerk test  neg     neg    EXTREMITY NEURO-VASCULAR EXAM:    Sensation grossly intact to light touch all dermatomal regions.    DTR 2+ Biceps, Triceps, BR and Negative Katies sign   Grossly intact motor function at Elbow, Wrist and Hand   Distal pulses radial and ulnar 2+, brisk cap refill, symmetric.      NECK:  Painless FROM and spinous processes non-tender. Negative Spurlings sign.      OTHER FINDINGS:  - scapular dyskinesia    XRAYS:  Xrays including AP, Outlet and Axillary Lateral of Left shoulder are ordered / images reviewed by me:   No fracture dislocation or other pathology   Acromion type 2   Proximal migration of humeral head: None   GH arthritis: minimal joint space loss    MRI Left shoulder shows labrum irregularity and possible rotator cuff tear.       ASSESSMENT:   Left shoulder pain, possible:  1. Left shoulder pain, unspecified chronicity    2. Rotator cuff disorder, left    3. Biceps tendinitis of left upper extremity        PLAN:      1. Diagnosis and Treatment explained.   2. CSI given  3. PT referral done  4. Follow up PRN    All questions were answered, patient will contact us for questions or concerns in the interim.             [1]   Current Outpatient Medications:     amLODIPine (NORVASC) 5 MG tablet, Take 1 tablet (5 mg total) by mouth every morning., Disp: 90 tablet, Rfl: 3    atorvastatin (LIPITOR) 10 MG tablet, Take 1 tablet (10 mg total) by mouth every evening., Disp: 90 tablet, Rfl: 3    blood-glucose meter Misc, use as directed, Disp: , Rfl:     dapagliflozin propanediol (FARXIGA) 10 mg tablet, Take 1 tablet (10 mg total) by mouth once daily., Disp: 90 tablet, Rfl: 3    diclofenac sodium (VOLTAREN) 1 % Gel, Apply 2 g topically 3 (three) times  daily., Disp: 100 g, Rfl: 0    fenofibrate micronized (LOFIBRA) 67 MG capsule, Take 1 capsule (67 mg total) by mouth before breakfast., Disp: 90 capsule, Rfl: 3    fluocinolone (SYNALAR) 0.025 % cream, Apply topically once daily., Disp: 15 g, Rfl: 1    losartan (COZAAR) 50 MG tablet, Take 1 tablet (50 mg total) by mouth once daily., Disp: 90 tablet, Rfl: 3    metFORMIN (GLUCOPHAGE) 1000 MG tablet, Take 1 tablet (1,000 mg total) by mouth 2 (two) times daily with meals., Disp: 180 tablet, Rfl: 3    metoprolol succinate (TOPROL-XL) 100 MG 24 hr tablet, Take 1 tablet (100 mg total) by mouth once daily., Disp: 90 tablet, Rfl: 3    paroxetine (PAXIL) 10 MG tablet, Take 1 tablet (10 mg total) by mouth once daily., Disp: 90 tablet, Rfl: 3    RSV, preF A and preF B,PF, (ABRYSVO, PF,) 120 mcg/0.5 mL SolR vaccine, Inject into the muscle., Disp: 0.5 mL, Rfl: 0

## 2025-02-27 NOTE — PROCEDURES
Large Joint Aspiration/Injection: L subacromial bursa    Date/Time: 2/27/2025 8:45 AM    Performed by: Carter Mujica MD  Authorized by: Carter Mujica MD    Consent Done?:  Yes (Verbal)  Indications:  Pain  Site marked: the procedure site was marked    Timeout: prior to procedure the correct patient, procedure, and site was verified    Prep: patient was prepped and draped in usual sterile fashion    Local anesthesia used?: No      Details:  Needle Size:  22 G  Ultrasonic Guidance for needle placement?: No    Approach:  Posterior  Location:  Shoulder  Site:  L subacromial bursa  Medications:  40 mg triamcinolone acetonide 40 mg/mL  Patient tolerance:  Patient tolerated the procedure well with no immediate complications

## 2025-03-03 RX ORDER — INSULIN PUMP SYRINGE, 3 ML
EACH MISCELLANEOUS
Refills: 0 | Status: CANCELLED | OUTPATIENT
Start: 2025-03-03 | End: 2026-03-03

## 2025-03-14 ENCOUNTER — CLINICAL SUPPORT (OUTPATIENT)
Dept: REHABILITATION | Facility: HOSPITAL | Age: 70
End: 2025-03-14
Attending: ORTHOPAEDIC SURGERY
Payer: COMMERCIAL

## 2025-03-14 DIAGNOSIS — M25.512 LEFT SHOULDER PAIN, UNSPECIFIED CHRONICITY: ICD-10-CM

## 2025-03-14 DIAGNOSIS — M75.22 BICEPS TENDINITIS OF LEFT UPPER EXTREMITY: ICD-10-CM

## 2025-03-14 DIAGNOSIS — M67.912 ROTATOR CUFF DISORDER, LEFT: ICD-10-CM

## 2025-03-14 DIAGNOSIS — M25.612 DECREASED ROM OF LEFT SHOULDER: Primary | ICD-10-CM

## 2025-03-14 DIAGNOSIS — R29.3 ABNORMAL POSTURE: ICD-10-CM

## 2025-03-14 PROCEDURE — 97112 NEUROMUSCULAR REEDUCATION: CPT

## 2025-03-14 PROCEDURE — 97110 THERAPEUTIC EXERCISES: CPT

## 2025-03-14 NOTE — PROGRESS NOTES
[facility]   Physical Therapy Treatment Note / Updated plan of care      Name: Ciera Stallings MD  Clinic Number: [id number]    Therapy Diagnosis:   Encounter Diagnoses   Name Primary?    Left shoulder pain, unspecified chronicity     Rotator cuff disorder, left     Biceps tendinitis of left upper extremity            Physician: Carter Mujica MD    Visit Date: 3/14/2025    Physician Orders: PT Eval and Treat   Medical Diagnosis from Referral:   M24.812 (ICD-10-CM) - Internal derangement of left shoulder   S40.012D (ICD-10-CM) - Contusion of left shoulder, subsequent encounter   S50.02XD (ICD-10-CM) - Contusion of left elbow, subsequent encounter   S43.402D (ICD-10-CM) - Sprain of left shoulder, unspecified shoulder sprain type, subsequent encounter   Evaluation Date: 1/9/2025  Authorization Period Expiration: 1/2/26  Plan of Care Expiration: Extend to 5/14/2025   Visit # / Visits authorized: 1/1, 9/12     Foto  Date  Score    #1/3 1/9/2025 50   #2/3       #3/3             Precautions: Standard     PTA Visit #: 2/5     Time In: 800 am   Time Out: 855 am  Total Billable Time: 55 minutes    Subjective     Pt reports: she feels that she is getting better. Patient got an injection on 2/27 and it has been helping. Patient reports she is talking a medication at night to help with her pain. Patient reports she is able to reach behind her back better. Patient reports pulling and pushing the door has improved. Patient reports that reaching behind the back tends to be the most uncomfortable position for her at this time. Patient reports removing her jacket is bothersome as well.       She was compliant with home exercise program.  Response to previous treatment: no adverse affect  Functional change: none at this time     Pain: 4/10  Location:    Objective      3/14/2025      Shoulder Right Right Right Left Left Left Pain/Dysfunction with Movement     AROM PROM MMT AROM PROM MMT     Flexion  "130 160 4+/5 130 ! 165 ! 4+/5    Abduction 180 180 4+/5 180 180 4/5 !     Internal rotation 70 75 4+/5 75 80 4+/5      External Rotation 90 90 4+/5 40 ! 60 ! 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 Partially able          Treatment     Ciera received the treatments listed below:      therapeutic exercises/objective  to develop strength, endurance, ROM, and flexibility for 40 minutes including:    Pulleys 3 min abd/flex   Strap IR stretch 10x for 10s   Wall Slides 2x10 for 3s       Not performed today:  Wall slides flex/abd 5" 2x10  - with lift off for flexion   Sidelying IR stretch 3 min for 10s   ER and IR BTB 3x10   Wand ER 10s x 10   Wand flexion 10s" x10  - 2#  Standing scaption 2lbs 3x10   Kinesio tape inferior spinatus to deltoid and ant deltoid to medial scap   HEP review     MANUAL THERAPY TECHNIQUES including Soft tissue Mobilization were applied to L shoulder (deltoid) for 0 minutes.      neuromuscular re-education activities to improve: Coordination, Proprioception, Posture, and Motor control for 15 minutes. The following activities were included:      UBE 3/3 posture emphasis   Scapular retraction 3" 2x10   Rows 13lbs 3x10 (needed heavy cueing)          Not today   Shld taps @ EOM   Wall circles 30x cw/ccw  No Money YTB 3x10       therapeutic activities to improve functional performance for 0  minutes, including:        Patient Education and Home Exercises       Education provided: "   - HEP    Written Home Exercises Provided: yes. Exercises were reviewed and Ciera was able to demonstrate them prior to the end of the session.  Ciera demonstrated good  understanding of the education provided. See EMR under Patient Instructions for exercises provided during therapy sessions    Assessment   Patient returned to PT today and is feeling much better subjectively and her objective measures are improving. Majority of todays session was spent with measurements and getting a history. Pateint was progressed back into exercises today and did well without adverse effects.        Ciera Is progressing well towards her goals.   Pt prognosis is Good.    Pt will continue to benefit from skilled outpatient physical therapy to address the deficits listed in the problem list box on initial evaluation, provide pt/family education and to maximize pt's level of independence in the home and community environment.     Pt's spiritual, cultural and educational needs considered and pt agreeable to plan of care and goals.     Anticipated barriers to physical therapy: none    Goals:   Short Term Goals (4 Weeks):   1. Pt will be independent with HEP to supplement PT in improving functional use of R UE.  2. Pt will increase pain free L shoulder flexion and abduction PROM to at least >150 deg to improve functional mobility of UE  3. Pt will increase L shoulder ER / IR PROM to at least 90/80 deg to improve functional mobility of UE  4. Pt will improve sitting posture without cueing from therapist   Long Term Goals (8 Weeks):   1. Pt will improve FOTO score to >/=65 to decrease perceived limitation with carrying, moving, and handling objects.  2. Pt will increase L shoulder PROM to WNL in all planes to improve functional use of R RUE.  3. Pt will increase L shoulder AROM to WFL in all planes to improve functional use of R RUE.  4. Pt will improve L shoulder MMTs to = 4+/5 to promote equal use of B UEs in performing functional  tasks.  5. Pt will lift 15 lb objects without pain to promote functional QOL.   6. Pt to report pain </= 0/10 with ADLs and IADLs using L UE to improve functional QOL.    Plan     Plan of care Certification: Extend to 5/14/2025     Outpatient Physical Therapy 3 times weekly for 8 weeks to include the following interventions: Cervical/Lumbar Traction, Electrical Stimulation re-eval, dry needling, Gait Training, Iontophoresis (with ), Manual Therapy, Moist Heat/ Ice, Neuromuscular Re-ed, Patient Education, Self Care, Therapeutic Activities, and Therapeutic Exercise.      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      Physical therapist and physical therapy assistant(s) will met face to face to discuss patient's treatment plan and progress towards established goals. Pt will be seen by a physical therapist minimally every 6th visit or every 30 days.    PT/PTA met face to face to discuss pt's treatment plan and progress towards established goals. Pt will be seen by a physical therapist minimally every 6th visit or every 30 days.      Stevo Holcomb, PT, DPT

## 2025-03-17 ENCOUNTER — CLINICAL SUPPORT (OUTPATIENT)
Dept: REHABILITATION | Facility: HOSPITAL | Age: 70
End: 2025-03-17
Payer: COMMERCIAL

## 2025-03-17 DIAGNOSIS — R29.3 ABNORMAL POSTURE: ICD-10-CM

## 2025-03-17 DIAGNOSIS — M25.512 CHRONIC LEFT SHOULDER PAIN: Primary | ICD-10-CM

## 2025-03-17 DIAGNOSIS — G89.29 CHRONIC LEFT SHOULDER PAIN: Primary | ICD-10-CM

## 2025-03-17 DIAGNOSIS — M25.612 DECREASED ROM OF LEFT SHOULDER: ICD-10-CM

## 2025-03-17 PROCEDURE — 97110 THERAPEUTIC EXERCISES: CPT

## 2025-03-17 PROCEDURE — 97112 NEUROMUSCULAR REEDUCATION: CPT

## 2025-03-17 NOTE — PROGRESS NOTES
[facility]   Physical Therapy Treatment Note / Updated plan of care      Name: Ciera Stallings MD  Clinic Number: [id number]    Therapy Diagnosis:   Encounter Diagnoses   Name Primary?    Chronic left shoulder pain Yes    Decreased ROM of left shoulder     Abnormal posture              Physician: Terrell Roque MD    Visit Date: 3/17/2025    Physician Orders: PT Eval and Treat   Medical Diagnosis from Referral:   M24.812 (ICD-10-CM) - Internal derangement of left shoulder   S40.012D (ICD-10-CM) - Contusion of left shoulder, subsequent encounter   S50.02XD (ICD-10-CM) - Contusion of left elbow, subsequent encounter   S43.402D (ICD-10-CM) - Sprain of left shoulder, unspecified shoulder sprain type, subsequent encounter   Evaluation Date: 1/9/2025  Authorization Period Expiration: 1/2/26  Plan of Care Expiration: Extend to 5/14/2025   Visit # / Visits authorized: 1/1, 10/12     Foto  Date  Score    #1/3 1/9/2025 50   #2/3       #3/3             Precautions: Standard     PTA Visit #: 0/5     Time In: 800   Time Out: 855  Total Billable Time: 55 minutes    Subjective     Pt reports: she felt good after last session. She was sore, but it was fine afterwards.       She was compliant with home exercise program.  Response to previous treatment: no adverse affect  Functional change: none at this time     Pain: 4/10  Location:    Objective      3/14/2025      Shoulder Right Right Right Left Left Left Pain/Dysfunction with Movement     AROM PROM MMT AROM PROM MMT     Flexion 130 160 4+/5 130 ! 165 ! 4+/5    Abduction 180 180 4+/5 180 180 4/5 !     Internal rotation 70 75 4+/5 75 80 4+/5      External Rotation 90 90 4+/5 40 ! 60 ! 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 Partially able          Treatment     Ciera received the treatments listed below:      therapeutic exercises to develop strength, endurance, ROM, and flexibility for 25 minutes including:    Pulleys 3 min abd/flex   Strap IR stretch 10x for 10s   Wall Slides 2x10 for 3s   - flex and abd        Not performed today:  Sidelying IR stretch 3 min for 10s   Wand ER 10s x 10   Wand flexion 10s" x10  - 2#  Standing scaption 2lbs 3x10   Kinesio tape inferior spinatus to deltoid and ant deltoid to medial scap   HEP review     MANUAL THERAPY TECHNIQUES including Soft tissue Mobilization were applied to L shoulder (deltoid) for 0 minutes.      neuromuscular re-education activities to improve: Coordination, Proprioception, Posture, and Motor control for 30 minutes. The following activities were included:      UBE 3/3 posture emphasis   No Money YTB 3x10   Rows 13lbs 3x10 (needed heavy cueing)   ER and IR GTB 3x10   - posture emphasis / cueing              therapeutic activities to improve functional performance for 0  minutes, including:        Patient Education and Home Exercises       Education provided:   - HEP    Written Home Exercises Provided: yes. Exercises were reviewed and Ciera was able to demonstrate them prior to the end of the session.  Ciera demonstrated good  understanding of the education provided. See EMR under Patient Instructions for exercises provided during therapy sessions    Assessment   Patient presents to PT today with continued shoulder pain, but has improved overall. Patient took increased time for majority of exercises. Needed cueing for most of todays exercises       Ciera Is progressing well " towards her goals.   Pt prognosis is Good.    Pt will continue to benefit from skilled outpatient physical therapy to address the deficits listed in the problem list box on initial evaluation, provide pt/family education and to maximize pt's level of independence in the home and community environment.     Pt's spiritual, cultural and educational needs considered and pt agreeable to plan of care and goals.     Anticipated barriers to physical therapy: none    Goals:   Short Term Goals (4 Weeks):   1. Pt will be independent with HEP to supplement PT in improving functional use of R UE.  2. Pt will increase pain free L shoulder flexion and abduction PROM to at least >150 deg to improve functional mobility of UE  3. Pt will increase L shoulder ER / IR PROM to at least 90/80 deg to improve functional mobility of UE  4. Pt will improve sitting posture without cueing from therapist   Long Term Goals (8 Weeks):   1. Pt will improve FOTO score to >/=65 to decrease perceived limitation with carrying, moving, and handling objects.  2. Pt will increase L shoulder PROM to WNL in all planes to improve functional use of R RUE.  3. Pt will increase L shoulder AROM to WFL in all planes to improve functional use of R RUE.  4. Pt will improve L shoulder MMTs to = 4+/5 to promote equal use of B UEs in performing functional tasks.  5. Pt will lift 15 lb objects without pain to promote functional QOL.   6. Pt to report pain </= 0/10 with ADLs and IADLs using L UE to improve functional QOL.    Plan     Plan of care Certification: Extend to 5/14/2025     Outpatient Physical Therapy 3 times weekly for 8 weeks to include the following interventions: Cervical/Lumbar Traction, Electrical Stimulation re-eval, dry needling, Gait Training, Iontophoresis (with ), Manual Therapy, Moist Heat/ Ice, Neuromuscular Re-ed, Patient Education, Self Care, Therapeutic Activities, and Therapeutic Exercise.      Upon discharge from further skilled PT  intervention it will determined if the need for a work conditioning program or Functional Capacity Evaluation is required to allow the injured worker to return to work with full potential achieved, continued improvement with body mechanics with advanced functional activities, and further minimize future work-related injuries.      Physical therapist and physical therapy assistant(s) will met face to face to discuss patient's treatment plan and progress towards established goals. Pt will be seen by a physical therapist minimally every 6th visit or every 30 days.    PT/PTA met face to face to discuss pt's treatment plan and progress towards established goals. Pt will be seen by a physical therapist minimally every 6th visit or every 30 days.      Stevo Holcomb, PT, DPT

## 2025-03-19 ENCOUNTER — CLINICAL SUPPORT (OUTPATIENT)
Dept: AUDIOLOGY | Facility: CLINIC | Age: 70
End: 2025-03-19
Payer: COMMERCIAL

## 2025-03-19 DIAGNOSIS — H90.3 SENSORINEURAL HEARING LOSS (SNHL), BILATERAL: Primary | ICD-10-CM

## 2025-03-19 PROCEDURE — 99499 UNLISTED E&M SERVICE: CPT | Mod: S$GLB,,,

## 2025-03-19 NOTE — PROGRESS NOTES
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 She reported she feels as though the hearing aids are comfortable, but protrude too much from her ears. Dr. Stallings also reported while talking on the phone the hearing aids seem weak. Programming was verified in the [employer] software in [facility]. Experience level was moved from a 2. Familiar User to 3. Experienced User. We also demonstrated a phone call and reviewed using the side volume buttons on her iPhone to turn up the call volume, which was set low. Cleaning the domes and changing filters were reviewed. Dr. Stallings would like to see if the hearing aids could be remade smaller. Otoscopy was unremarkable, bilaterally. Ear mold impressions were taken without incident and will be sent to [employer] to remake her hearing aids. A follow-up appointment to pick-up he remake was scheduled for 4/14/2025, but Dr. Stallings will be notified if the hearing aids comeback sooner and if an earlier appointment time in available. I also will hold her  here until her new remade devices arrive.     Recommendations:  Daily and consistent use of amplification  Hearing aid follow-up when remake arrives  Annual audiologic evaluation or sooner if change perceived  Hearing protection in noise

## 2025-03-26 ENCOUNTER — CLINICAL SUPPORT (OUTPATIENT)
Dept: REHABILITATION | Facility: HOSPITAL | Age: 70
End: 2025-03-26
Payer: COMMERCIAL

## 2025-03-26 DIAGNOSIS — R29.3 ABNORMAL POSTURE: ICD-10-CM

## 2025-03-26 DIAGNOSIS — M25.512 CHRONIC LEFT SHOULDER PAIN: Primary | ICD-10-CM

## 2025-03-26 DIAGNOSIS — G89.29 CHRONIC LEFT SHOULDER PAIN: Primary | ICD-10-CM

## 2025-03-26 DIAGNOSIS — M25.612 DECREASED ROM OF LEFT SHOULDER: ICD-10-CM

## 2025-03-26 PROCEDURE — 97112 NEUROMUSCULAR REEDUCATION: CPT

## 2025-03-26 PROCEDURE — 97110 THERAPEUTIC EXERCISES: CPT

## 2025-03-26 NOTE — PROGRESS NOTES
[facility]   Physical Therapy Treatment Note      Name: Ciera Stallings MD  Clinic Number: [id number]    Therapy Diagnosis:   Encounter Diagnoses   Name Primary?    Chronic left shoulder pain Yes    Decreased ROM of left shoulder     Abnormal posture        Physician: Terrell Roque MD    Visit Date: 3/26/2025    Physician Orders: PT Eval and Treat   Medical Diagnosis from Referral:   M24.812 (ICD-10-CM) - Internal derangement of left shoulder   S40.012D (ICD-10-CM) - Contusion of left shoulder, subsequent encounter   S50.02XD (ICD-10-CM) - Contusion of left elbow, subsequent encounter   S43.402D (ICD-10-CM) - Sprain of left shoulder, unspecified shoulder sprain type, subsequent encounter   Evaluation Date: 1/9/2025  Authorization Period Expiration: 1/2/26  Plan of Care Expiration: Extend to 5/14/2025   Visit # / Visits authorized: 1/1, 10/12, 3/12     Foto  Date  Score    #1/3 1/9/2025 50   #2/3       #3/3             Precautions: Standard     PTA Visit #: 0/5     Time In: 800   Time Out: 853  Total Billable Time: 53 minutes    Subjective     Pt reports: she is feeling much better but still has pain with outreached motions, but is able to reach behind her back much better.     She was compliant with home exercise program.  Response to previous treatment: no adverse affect  Functional change: none at this time     Pain: 3/10  Location:    Objective      3/14/2025      Shoulder Right Right Right Left Left Left Pain/Dysfunction with Movement     AROM PROM MMT AROM PROM MMT     Flexion 130 160 4+/5 130 ! 165 ! 4+/5    Abduction 180 180 4+/5 180 180 4/5 !     Internal rotation 70 75 4+/5 75 80 4+/5      External Rotation 90 90 4+/5 40 ! 60 ! 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 Partially able          Treatment     Ciera received the treatments listed below:      therapeutic exercises to develop strength, endurance, ROM, and flexibility for 23 minutes including:    Pulleys 3 min abd/flex   Strap IR stretch 10x for 10s   Wall Slides 2x10 for 3s   - flex and abd        Not performed today:  Sidelying IR stretch 3 min for 10s   Wand ER 10s x 10   Wand flexion 10s" x10  - 2#  Standing scaption 2lbs 3x10   Kinesio tape inferior spinatus to deltoid and ant deltoid to medial scap   HEP review     MANUAL THERAPY TECHNIQUES including Soft tissue Mobilization were applied to L shoulder (deltoid) for 0 minutes.      neuromuscular re-education activities to improve: Coordination, Proprioception, Posture, and Motor control for 30 minutes. The following activities were included:      UBE 3/3 posture emphasis   Scapular retractions 2x10   No Money YTB 3x10   Rows 13lbs 3x10 (needed heavy cueing)   ER RTB 3x10   - posture emphasis / cueing   IR GTB 3x10            therapeutic activities to improve functional performance for 0  minutes, including:        Patient Education and Home Exercises       Education provided:   - HEP    Written Home Exercises Provided: yes. Exercises were reviewed and Ciera was able to demonstrate them prior to the end of the session.  Ciera demonstrated good  understanding of the education provided. See EMR under Patient Instructions for exercises provided during therapy sessions    Assessment   Patient presents to PT today with continued shoulder pain, but has improved overall. Patient took increased time for majority of exercises. Needed cueing for most of todays " exercises, especially with scapular retractions / rowing       Ciera Is progressing well towards her goals.   Pt prognosis is Good.    Pt will continue to benefit from skilled outpatient physical therapy to address the deficits listed in the problem list box on initial evaluation, provide pt/family education and to maximize pt's level of independence in the home and community environment.     Pt's spiritual, cultural and educational needs considered and pt agreeable to plan of care and goals.     Anticipated barriers to physical therapy: none    Goals:   Short Term Goals (4 Weeks):   1. Pt will be independent with HEP to supplement PT in improving functional use of R UE.  2. Pt will increase pain free L shoulder flexion and abduction PROM to at least >150 deg to improve functional mobility of UE  3. Pt will increase L shoulder ER / IR PROM to at least 90/80 deg to improve functional mobility of UE  4. Pt will improve sitting posture without cueing from therapist   Long Term Goals (8 Weeks):   1. Pt will improve FOTO score to >/=65 to decrease perceived limitation with carrying, moving, and handling objects.  2. Pt will increase L shoulder PROM to WNL in all planes to improve functional use of R RUE.  3. Pt will increase L shoulder AROM to WFL in all planes to improve functional use of R RUE.  4. Pt will improve L shoulder MMTs to = 4+/5 to promote equal use of B UEs in performing functional tasks.  5. Pt will lift 15 lb objects without pain to promote functional QOL.   6. Pt to report pain </= 0/10 with ADLs and IADLs using L UE to improve functional QOL.    Plan     Plan of care Certification: Extend to 5/14/2025     Outpatient Physical Therapy 3 times weekly for 8 weeks to include the following interventions: Cervical/Lumbar Traction, Electrical Stimulation re-eval, dry needling, Gait Training, Iontophoresis (with ), Manual Therapy, Moist Heat/ Ice, Neuromuscular Re-ed, Patient Education, Self Care, Therapeutic  Activities, and Therapeutic Exercise.      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      Physical therapist and physical therapy assistant(s) will met face to face to discuss patient's treatment plan and progress towards established goals. Pt will be seen by a physical therapist minimally every 6th visit or every 30 days.    PT/PTA met face to face to discuss pt's treatment plan and progress towards established goals. Pt will be seen by a physical therapist minimally every 6th visit or every 30 days.      Stevo Holcomb, PT, DPT

## 2025-04-02 ENCOUNTER — CLINICAL SUPPORT (OUTPATIENT)
Dept: REHABILITATION | Facility: HOSPITAL | Age: 70
End: 2025-04-02
Payer: COMMERCIAL

## 2025-04-02 DIAGNOSIS — G89.29 CHRONIC LEFT SHOULDER PAIN: Primary | ICD-10-CM

## 2025-04-02 DIAGNOSIS — M25.612 DECREASED ROM OF LEFT SHOULDER: ICD-10-CM

## 2025-04-02 DIAGNOSIS — M25.512 CHRONIC LEFT SHOULDER PAIN: Primary | ICD-10-CM

## 2025-04-02 DIAGNOSIS — R29.3 ABNORMAL POSTURE: ICD-10-CM

## 2025-04-02 PROCEDURE — 97140 MANUAL THERAPY 1/> REGIONS: CPT

## 2025-04-02 PROCEDURE — 20560 NDL INSJ W/O NJX 1 OR 2 MUSC: CPT

## 2025-04-02 PROCEDURE — 97112 NEUROMUSCULAR REEDUCATION: CPT

## 2025-04-02 NOTE — PROGRESS NOTES
[facility]   Physical Therapy Treatment Note      Name: Ciera Stallings MD  Clinic Number: [id number]    Therapy Diagnosis:   Encounter Diagnoses   Name Primary?    Chronic left shoulder pain Yes    Decreased ROM of left shoulder     Abnormal posture        Physician: Terrell Roque MD    Visit Date: 4/2/2025    Physician Orders: PT Eval and Treat   Medical Diagnosis from Referral:   M24.812 (ICD-10-CM) - Internal derangement of left shoulder   S40.012D (ICD-10-CM) - Contusion of left shoulder, subsequent encounter   S50.02XD (ICD-10-CM) - Contusion of left elbow, subsequent encounter   S43.402D (ICD-10-CM) - Sprain of left shoulder, unspecified shoulder sprain type, subsequent encounter   Evaluation Date: 1/9/2025  Authorization Period Expiration: 1/2/26  Plan of Care Expiration: Extend to 5/14/2025   Visit # / Visits authorized: 1/1, 10/12, 3/12     Foto  Date  Score    #1/3 1/9/2025 50   #2/3       #3/3             Precautions: Standard     PTA Visit #: 0/5     Time In: 800   Time Out: 855  Total Billable Time: 55 minutes    Subjective     Pt reports: she is feeling much better but still has pain with outreached motions, but is able to reach behind her back much better.     She was compliant with home exercise program.  Response to previous treatment: no adverse affect  Functional change: none at this time     Pain: 3/10  Location:    Objective      3/14/2025      Shoulder Right Right Right Left Left Left Pain/Dysfunction with Movement     AROM PROM MMT AROM PROM MMT     Flexion 130 160 4+/5 130 ! 165 ! 4+/5    Abduction 180 180 4+/5 180 180 4/5 !     Internal rotation 70 75 4+/5 75 80 4+/5      External Rotation 90 90 4+/5 40 ! 60 ! 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 Partially able          Treatment     Ciera received the treatments listed below:      therapeutic exercises to develop strength, endurance, ROM, and flexibility for 0 minutes including:    Pulleys 3 min abd/flex   Strap IR stretch 10x for 10s   Wall Slides 2x10 for 3s   - flex and abd        Not performed today:  Sidelying IR stretch 3 min for 10s   Wand ER 10s x 10   Wand flexion 10s" x10  - 2#  Standing scaption 2lbs 3x10   Kinesio tape inferior spinatus to deltoid and ant deltoid to medial scap   HEP review     MANUAL THERAPY TECHNIQUES including Soft tissue Mobilization were applied to L shoulder (deltoid) for 0 minutes.      neuromuscular re-education activities to improve: Coordination, Proprioception, Posture, and Motor control for 40 minutes. The following activities were included:      UBE 3/3 posture emphasis   Scapular retractions 2x10   Sidelying ER 0#  - 3x10   Supine flexion 2# 3x10    Rows 7lbs 3x10 (needed heavy cueing)   ER RTB 3x10   - posture emphasis / cueing   IR GTB 3x10            therapeutic activities to improve functional performance for 0  minutes, including:      MANUAL THERAPY TECHNIQUES including dry needling were applied to L deltoid for 15 minutes.    Palpation to determine muscle location and trigger points.     Functional Dry Needling to L deltoid with 2 30mm needles attached to estim. Patient had great muscular contractions from todays needling session. No adverse effects noted       Patient Education and Home Exercises       Education provided:   - HEP    Written Home Exercises Provided: yes. Exercises were reviewed and Ciera was able to demonstrate them prior to " the end of the session.  Ciera demonstrated good  understanding of the education provided. See EMR under Patient Instructions for exercises provided during therapy sessions    Assessment   Patient presents to PT today with continued shoulder pain, but has improved overall. Patient took increased time for majority of exercises. Needed cueing for most of todays exercises, especially with scapular retractions / rowing. Manual therapy added today to address L shoulder pain and trigger points with active movement.       Ciera Is progressing well towards her goals.   Pt prognosis is Good.    Pt will continue to benefit from skilled outpatient physical therapy to address the deficits listed in the problem list box on initial evaluation, provide pt/family education and to maximize pt's level of independence in the home and community environment.     Pt's spiritual, cultural and educational needs considered and pt agreeable to plan of care and goals.     Anticipated barriers to physical therapy: none    Goals:   Short Term Goals (4 Weeks):   1. Pt will be independent with HEP to supplement PT in improving functional use of R UE.  2. Pt will increase pain free L shoulder flexion and abduction PROM to at least >150 deg to improve functional mobility of UE  3. Pt will increase L shoulder ER / IR PROM to at least 90/80 deg to improve functional mobility of UE  4. Pt will improve sitting posture without cueing from therapist   Long Term Goals (8 Weeks):   1. Pt will improve FOTO score to >/=65 to decrease perceived limitation with carrying, moving, and handling objects.  2. Pt will increase L shoulder PROM to WNL in all planes to improve functional use of R RUE.  3. Pt will increase L shoulder AROM to WFL in all planes to improve functional use of R RUE.  4. Pt will improve L shoulder MMTs to = 4+/5 to promote equal use of B UEs in performing functional tasks.  5. Pt will lift 15 lb objects without pain to promote functional QOL.    6. Pt to report pain </= 0/10 with ADLs and IADLs using L UE to improve functional QOL.    Plan     Plan of care Certification: Extend to 5/14/2025     Outpatient Physical Therapy 3 times weekly for 8 weeks to include the following interventions: Cervical/Lumbar Traction, Electrical Stimulation re-eval, dry needling, Gait Training, Iontophoresis (with ), Manual Therapy, Moist Heat/ Ice, Neuromuscular Re-ed, Patient Education, Self Care, Therapeutic Activities, and Therapeutic Exercise.      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      Physical therapist and physical therapy assistant(s) will met face to face to discuss patient's treatment plan and progress towards established goals. Pt will be seen by a physical therapist minimally every 6th visit or every 30 days.    PT/PTA met face to face to discuss pt's treatment plan and progress towards established goals. Pt will be seen by a physical therapist minimally every 6th visit or every 30 days.      Stevo Holcomb, PT, DPT

## 2025-04-04 ENCOUNTER — CLINICAL SUPPORT (OUTPATIENT)
Dept: REHABILITATION | Facility: HOSPITAL | Age: 70
End: 2025-04-04
Payer: COMMERCIAL

## 2025-04-04 DIAGNOSIS — R29.3 ABNORMAL POSTURE: ICD-10-CM

## 2025-04-04 DIAGNOSIS — M25.512 CHRONIC LEFT SHOULDER PAIN: Primary | ICD-10-CM

## 2025-04-04 DIAGNOSIS — G89.29 CHRONIC LEFT SHOULDER PAIN: Primary | ICD-10-CM

## 2025-04-04 DIAGNOSIS — M25.612 DECREASED ROM OF LEFT SHOULDER: ICD-10-CM

## 2025-04-04 PROCEDURE — 97110 THERAPEUTIC EXERCISES: CPT

## 2025-04-04 PROCEDURE — 97112 NEUROMUSCULAR REEDUCATION: CPT

## 2025-04-04 NOTE — PROGRESS NOTES
[facility]   Physical Therapy Treatment Note      Name: Ciera Stallings MD  Clinic Number: [id number]    Therapy Diagnosis:   Encounter Diagnoses   Name Primary?    Chronic left shoulder pain Yes    Decreased ROM of left shoulder     Abnormal posture          Physician: Terrell Roque MD    Visit Date: 4/4/2025    Physician Orders: PT Eval and Treat   Medical Diagnosis from Referral:   M24.812 (ICD-10-CM) - Internal derangement of left shoulder   S40.012D (ICD-10-CM) - Contusion of left shoulder, subsequent encounter   S50.02XD (ICD-10-CM) - Contusion of left elbow, subsequent encounter   S43.402D (ICD-10-CM) - Sprain of left shoulder, unspecified shoulder sprain type, subsequent encounter   Evaluation Date: 1/9/2025  Authorization Period Expiration: 1/2/26  Plan of Care Expiration: Extend to 5/14/2025   Visit # / Visits authorized: 1/1, 10/12, 5/12     Foto  Date  Score    #1/3 1/9/2025 50   #2/3       #3/3             Precautions: Standard     PTA Visit #: 0/5     Time In: 800   Time Out: 855  Total Billable Time: 55 minutes    Subjective     Pt reports: she does not know if the needling help last session. She does not feel any worse than she did     She was compliant with home exercise program.  Response to previous treatment: no adverse affect  Functional change: none at this time     Pain: 3/10  Location: L shoulder     Objective      3/14/2025    Shoulder Right Right Right Left Left Left Pain/Dysfunction with Movement     AROM PROM MMT AROM PROM MMT     Flexion 130 160 4+/5 130 ! 165 ! 4+/5    Abduction 180 180 4+/5 180 180 4/5 !     Internal rotation 70 75 4+/5 75 80 4+/5      External Rotation 90 90 4+/5 40 ! 60 ! 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 Partially able          Treatment     Ciera received the treatments listed below:      therapeutic exercises to develop strength, endurance, ROM, and flexibility for 10 minutes including:      Supine flexion 3# 3x10    Strap IR stretch 10x for 10s   Wall Slides 2x10 for 3s   - flex and abd        Not performed today:  Sidelying IR stretch 3 min for 10s   Wand ER 10s x 10   Wand flexion 10s" x10  - 2#  Standing scaption 2lbs 3x10   Kinesio tape inferior spinatus to deltoid and ant deltoid to medial scap   HEP review     MANUAL THERAPY TECHNIQUES including Soft tissue Mobilization were applied to L shoulder (deltoid) for 0 minutes.      neuromuscular re-education activities to improve: Coordination, Proprioception, Posture, and Motor control for 45 minutes. The following activities were included:      UBE 3/3 posture emphasis   Standing Scaption 2# 3x10   - minimize shoulder shrug   Standing abd 1# 3x10   - minimize shoulder shrug  Single arm row w/ scap squeeze  - 7# 3x10   Prone L shoulder ext 2#  - 3x10   - scap squeeze   Prone T 0#  - 3x10   - scap squeeze          Sidelying ER 0#  - 3x10   ER RTB 3x10   - posture emphasis / cueing   IR GTB 3x10            therapeutic activities to improve functional performance for 0  minutes, including:      MANUAL THERAPY TECHNIQUES including dry needling were applied to L deltoid for 0 minutes.    Palpation to determine muscle location and trigger points.     Functional Dry Needling to L deltoid with 2 30mm needles attached to estim. Patient had great muscular contractions from todays needling session. No adverse effects noted       Patient Education and Home Exercises   "     Education provided:   - HEP    Written Home Exercises Provided: yes. Exercises were reviewed and Ciera was able to demonstrate them prior to the end of the session.  Ciera demonstrated good  understanding of the education provided. See EMR under Patient Instructions for exercises provided during therapy sessions    Assessment   Patient presents to PT today with continued shoulder pain, but has improved overall. Patient took increased time for majority of exercises. Needed cueing for most of exercises involving scap retractions. Progressed NMR today for postural training. Needed cueing to maintain proper posture/technique for majority of exercises.       Ciera Is progressing well towards her goals.   Pt prognosis is Good.    Pt will continue to benefit from skilled outpatient physical therapy to address the deficits listed in the problem list box on initial evaluation, provide pt/family education and to maximize pt's level of independence in the home and community environment.     Pt's spiritual, cultural and educational needs considered and pt agreeable to plan of care and goals.     Anticipated barriers to physical therapy: none    Goals:   Short Term Goals (4 Weeks):   1. Pt will be independent with HEP to supplement PT in improving functional use of R UE.  2. Pt will increase pain free L shoulder flexion and abduction PROM to at least >150 deg to improve functional mobility of UE  3. Pt will increase L shoulder ER / IR PROM to at least 90/80 deg to improve functional mobility of UE  4. Pt will improve sitting posture without cueing from therapist   Long Term Goals (8 Weeks):   1. Pt will improve FOTO score to >/=65 to decrease perceived limitation with carrying, moving, and handling objects.  2. Pt will increase L shoulder PROM to WNL in all planes to improve functional use of R RUE.  3. Pt will increase L shoulder AROM to WFL in all planes to improve functional use of R RUE.  4. Pt will improve L shoulder MMTs  to = 4+/5 to promote equal use of B UEs in performing functional tasks.  5. Pt will lift 15 lb objects without pain to promote functional QOL.   6. Pt to report pain </= 0/10 with ADLs and IADLs using L UE to improve functional QOL.    Plan     Plan of care Certification: Extend to 5/14/2025     Outpatient Physical Therapy 3 times weekly for 8 weeks to include the following interventions: Cervical/Lumbar Traction, Electrical Stimulation re-eval, dry needling, Gait Training, Iontophoresis (with ), Manual Therapy, Moist Heat/ Ice, Neuromuscular Re-ed, Patient Education, Self Care, Therapeutic Activities, and Therapeutic Exercise.      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      Physical therapist and physical therapy assistant(s) will met face to face to discuss patient's treatment plan and progress towards established goals. Pt will be seen by a physical therapist minimally every 6th visit or every 30 days.    PT/PTA met face to face to discuss pt's treatment plan and progress towards established goals. Pt will be seen by a physical therapist minimally every 6th visit or every 30 days.      Stevo Holcomb, PT, DPT

## 2025-04-09 ENCOUNTER — OFFICE VISIT (OUTPATIENT)
Dept: OPTOMETRY | Facility: CLINIC | Age: 70
End: 2025-04-09
Payer: COMMERCIAL

## 2025-04-09 DIAGNOSIS — H52.13 MYOPIA WITH ASTIGMATISM AND PRESBYOPIA, BILATERAL: ICD-10-CM

## 2025-04-09 DIAGNOSIS — H52.4 MYOPIA WITH ASTIGMATISM AND PRESBYOPIA, BILATERAL: ICD-10-CM

## 2025-04-09 DIAGNOSIS — H40.013 OPEN ANGLE WITH BORDERLINE FINDINGS AND LOW GLAUCOMA RISK IN BOTH EYES: ICD-10-CM

## 2025-04-09 DIAGNOSIS — E78.5 HYPERLIPIDEMIA ASSOCIATED WITH TYPE 2 DIABETES MELLITUS: ICD-10-CM

## 2025-04-09 DIAGNOSIS — H25.13 NUCLEAR SCLEROSIS, BILATERAL: ICD-10-CM

## 2025-04-09 DIAGNOSIS — E11.9 TYPE 2 DIABETES MELLITUS WITHOUT RETINOPATHY: Primary | ICD-10-CM

## 2025-04-09 DIAGNOSIS — E11.36 TYPE 2 DIABETES MELLITUS WITH CATARACT: ICD-10-CM

## 2025-04-09 DIAGNOSIS — H04.123 DRY EYES, BILATERAL: ICD-10-CM

## 2025-04-09 DIAGNOSIS — E11.69 HYPERLIPIDEMIA ASSOCIATED WITH TYPE 2 DIABETES MELLITUS: ICD-10-CM

## 2025-04-09 DIAGNOSIS — H52.203 MYOPIA WITH ASTIGMATISM AND PRESBYOPIA, BILATERAL: ICD-10-CM

## 2025-04-09 DIAGNOSIS — H11.152 PINGUECULA OF LEFT EYE: ICD-10-CM

## 2025-04-09 PROCEDURE — 99999 PR PBB SHADOW E&M-EST. PATIENT-LVL II: CPT | Mod: PBBFAC,,, | Performed by: OPTOMETRIST

## 2025-04-09 PROCEDURE — 1101F PT FALLS ASSESS-DOCD LE1/YR: CPT | Mod: CPTII,S$GLB,, | Performed by: OPTOMETRIST

## 2025-04-09 PROCEDURE — 92015 DETERMINE REFRACTIVE STATE: CPT | Mod: S$GLB,,, | Performed by: OPTOMETRIST

## 2025-04-09 PROCEDURE — 1159F MED LIST DOCD IN RCRD: CPT | Mod: CPTII,S$GLB,, | Performed by: OPTOMETRIST

## 2025-04-09 PROCEDURE — 1126F AMNT PAIN NOTED NONE PRSNT: CPT | Mod: CPTII,S$GLB,, | Performed by: OPTOMETRIST

## 2025-04-09 PROCEDURE — 3288F FALL RISK ASSESSMENT DOCD: CPT | Mod: CPTII,S$GLB,, | Performed by: OPTOMETRIST

## 2025-04-09 PROCEDURE — 92004 COMPRE OPH EXAM NEW PT 1/>: CPT | Mod: S$GLB,,, | Performed by: OPTOMETRIST

## 2025-04-09 PROCEDURE — 2023F DILAT RTA XM W/O RTNOPTHY: CPT | Mod: CPTII,S$GLB,, | Performed by: OPTOMETRIST

## 2025-04-09 PROCEDURE — 4010F ACE/ARB THERAPY RXD/TAKEN: CPT | Mod: CPTII,S$GLB,, | Performed by: OPTOMETRIST

## 2025-04-09 NOTE — PROGRESS NOTES
HPI    HARJINDER: [facility] 2023  Last DFE: Yes  Chief complaint (CC): 69 yr old female in today for ocular health.   Glasses? Yes  Contacts? No  H/o eye surgery, injections or laser:   H/o eye injury:   Known eye conditions?Dry eyes, bilateral    Myopia with astigmatism and presbyopia, bilateral    Nuclear sclerosis, bilateral    Open angle with borderline findings and low glaucoma risk in both eyes    Pinguecula of left eye    Cardiac/Vascular  Benign essential hypertension    Hyperlipidemia associated with type 2 diabetes mellitus    Endocrine  Type 2 diabetes mellitus without retinopathy       Family h/o eye conditions? -  Eye gtts? no      (-) Flashes (-)  Floaters (-) Mucous   (+)  Tearing (+) Itching (-) Burning   (-) Headaches (-) Eye Pain/discomfort (+) Irritation   (-)  Redness (-) Double vision (+) Blurry vision    CL Exam: No  Current CL Brand: -  Rx OD     OS               Wears full-time or part-time:  Full time/Part Time     Sleeps with contact lenses:  Yes/No     CL Solution used:     How often replace CLs:      Any problem with VA with CLs?  Yes/No     Diabetic? Yes  A1c? Lab Results       Component                Value               Date                       HGBA1C                   6.2 (H)             04/19/2024              Last edited by Charley Cortes, OD on 4/9/2025 11:00 AM.            Assessment /Plan     For exam results, see Encounter Report.    Type 2 diabetes mellitus without retinopathy    Dry eyes, bilateral    Pinguecula of left eye    Open angle with borderline findings and low glaucoma risk in both eyes  -     [place] Visual Field - OU - Extended - Both Eyes; Future  -     OCT, Optic Nerve - OU - Both Eyes; Future  -     Ultrasound pachymetry; Future    Myopia with astigmatism and presbyopia, bilateral    Nuclear sclerosis, bilateral    Type 2 diabetes mellitus with cataract    Hyperlipidemia associated with type 2 diabetes mellitus      MONITOR. ED PT ON ALL EXAM  FINDIGNS  RX FINAL SPECS   MILD NS OU; UV PROTECTION; MONITIOR  TYPE 2 DM W/O RETINOPATHY OU; CONTINUE W/ PCP AND ENDO FOR GLYCEMIC/BP CONTROL AND MGMT; MONITOR  OAG SUSPECT OU PER HISTORY; MONITOR. RTC FOR TESTING (24-2 VF, OCT RNFL, IOP, GONIO, PACH)   LONGSTANDING PING OU; UV PROTECTION AND AT'S PRN   RTC 1 YR//PRN FOR REE/DFE

## 2025-04-10 ENCOUNTER — PATIENT MESSAGE (OUTPATIENT)
Dept: OPTOMETRY | Facility: CLINIC | Age: 70
End: 2025-04-10
Payer: COMMERCIAL

## 2025-04-10 ENCOUNTER — OFFICE VISIT (OUTPATIENT)
Dept: SPORTS MEDICINE | Facility: CLINIC | Age: 70
End: 2025-04-10
Payer: COMMERCIAL

## 2025-04-10 ENCOUNTER — PATIENT MESSAGE (OUTPATIENT)
Dept: ADMINISTRATIVE | Facility: HOSPITAL | Age: 70
End: 2025-04-10
Payer: COMMERCIAL

## 2025-04-10 VITALS
HEIGHT: 64 IN | DIASTOLIC BLOOD PRESSURE: 69 MMHG | HEART RATE: 76 BPM | WEIGHT: 112.44 LBS | SYSTOLIC BLOOD PRESSURE: 112 MMHG | BODY MASS INDEX: 19.2 KG/M2

## 2025-04-10 DIAGNOSIS — M67.912 ROTATOR CUFF DISORDER, LEFT: Primary | ICD-10-CM

## 2025-04-10 PROCEDURE — 99214 OFFICE O/P EST MOD 30 MIN: CPT | Mod: S$GLB,,, | Performed by: ORTHOPAEDIC SURGERY

## 2025-04-10 PROCEDURE — 99999 PR PBB SHADOW E&M-EST. PATIENT-LVL III: CPT | Mod: PBBFAC,,, | Performed by: ORTHOPAEDIC SURGERY

## 2025-04-10 NOTE — PROGRESS NOTES
CC: LEFT shoulder pain    Patient is here today for a follow-up of her left shoulder pain. Patient is feeling good, reports a pain scale of 1-2/10. She is doing PT 2x a week at [facility] and tolerating it well. She received a CSI on 2/27/25 and reports doing well. Patient states she does not feel she needs to be seen again for now.     History from 2/27/25   69 y.o. Female with a 1 year history of left shoulder traumatic pain.  Patient works as a  at [employer]. She had a fall onto the outstretched hand a year ago. She complains of diffuse pain all around the shoulder.   She states that the pain is severe and not responding to any conservative care.      She reports that the pain and weakness is worse with overhead activity. It also bothers her at night.    Is affecting ADLs.  Pain is 8/10 at it's worst. She had done some amount of PT and tried oral pain medications.       Past Medical History:   Diagnosis Date    Benign essential hypertension 01/16/2023    MARQUITA (generalized anxiety disorder) 01/16/2023    Hyperlipidemia associated with type 2 diabetes mellitus 06/06/2024    Subclinical hypothyroidism 01/16/2023    Type 2 diabetes mellitus without retinopathy 12/29/2013       Past Surgical History:   Procedure Laterality Date    APPENDECTOMY N/A     ELBOW FRACTURE SURGERY Right     HIP FRACTURE SURGERY Left        Family History   Problem Relation Name Age of Onset    Diabetes Mother jorge l     Hypertension Mother jorge l     Cancer Father AVS Tiana     Diabetes Sister majo,     Hearing loss Sister majo,     Diabetes Brother jorge luis, alberto        Current Medications[1]    Review of patient's allergies indicates:  No Known Allergies       REVIEW OF SYSTEMS:  Constitution: Negative. Negative for chills, fever and night sweats.   HENT: Negative for congestion and headaches.    Eyes: Negative for blurred vision, left vision loss and right vision loss.   Cardiovascular: Negative for chest pain and  "syncope.   Respiratory: Negative for cough and shortness of breath.    Endocrine: Negative for polydipsia, polyphagia and polyuria.   Hematologic/Lymphatic: Negative for bleeding problem. Does not bruise/bleed easily.   Skin: Negative for dry skin, itching and rash.   Musculoskeletal: Negative for falls.  Positive for left shoulder pain and muscle weakness.   Gastrointestinal: Negative for abdominal pain and bowel incontinence.   Genitourinary: Negative for bladder incontinence and nocturia.   Neurological: Negative for disturbances in coordination, loss of balance and seizures.   Psychiatric/Behavioral: Negative for depression. The patient does not have insomnia.    Allergic/Immunologic: Negative for hives and persistent infections.      PHYSICAL EXAMINATION:  Vitals:  /69   Pulse 76   Ht 5' 4" (1.626 m)   Wt 51 kg (112 lb 7 oz)   BMI 19.30 kg/m²    General: The patient is alert and oriented x 3.  Mood is pleasant.  Observation of ears, eyes and nose reveal no gross abnormalities.  No labored breathing observed.  Gait is coordinated. Patient can toe walk and heel walk without difficulty.      LEFT Shoulder / Upper Extremity Exam    OBSERVATION:     Swelling  none  Deformity  none   Discoloration  none   Scapular winging none   Scars   none  Atrophy  none    TENDERNESS / CREPITUS (T/C):          T/C      T/C   Clavicle   -/-  SUPRAspinatus    -/-     AC Jt.    -/-  INFRAspinatus  -/-    SC Jt.    -/-  Deltoid    -/-      G. Tuberosity  -/-  LH BICEP groove  -/-   Acromion:  -/-  Midline Neck   -/-     Scapular Spine -/-  Trapezium   -/-   SMA Scapula  -/-  GH jt. line - post  -/-     Scapulothoracic  -/-         ROM: (* = with pain)  Right shoulder   Left shoulder        AROM (PROM)   AROM (PROM)   FE    170° (175°)     *130° (140°)     ER at 0°    60°  (65°)    *50°  (65°)   ER at 90° ABD  90°  (90°)    *70°  (70°)   IR at 90°  ABD   NA  (40°)     NA  (40°)      IR (spine level)   T10 "     T10    STRENGTH: (* = with pain) Right shoulder   Left shoulder    SCAPTION   5/5    5/5    IR    5/5    5/5   ER    5/5    5/5   BICEPS   5/5    5/5   Deltoid    5/5    5/5     SIGNS:  Painful side       NEER   -    OMILESS  -   HOFF   -    SPEEDS  -     DROP ARM   -   BELLY PRESS -   Superior escape none    LIFT-OFF  neg   X-Body ADD    neg    MOVING VALGUS neg        STABILITY TESTING    Right shoulder   Left shoulder    Translation     Anterior  up face     up face    Posterior  up face    up face    Sulcus   < 10mm    < 10 mm     Signs   Apprehension   neg      neg       Relocation   no change     no change      Jerk test  neg     neg    EXTREMITY NEURO-VASCULAR EXAM:    Sensation grossly intact to light touch all dermatomal regions.    DTR 2+ Biceps, Triceps, BR and Negative Katies sign   Grossly intact motor function at Elbow, Wrist and Hand   Distal pulses radial and ulnar 2+, brisk cap refill, symmetric.      NECK:  Painless FROM and spinous processes non-tender. Negative Spurlings sign.      OTHER FINDINGS:  - scapular dyskinesia    XRAYS:  Xrays including AP, Outlet and Axillary Lateral of Left shoulder are ordered / images reviewed by me:   No fracture dislocation or other pathology   Acromion type 2   Proximal migration of humeral head: None   GH arthritis: minimal joint space loss    MRI Left shoulder shows labrum irregularity and possible rotator cuff tear.       ASSESSMENT:   Left shoulder pain, possible:  No diagnosis found.      PLAN:      1. Finish PT and transition to HEP  2. Follow up PRN    All questions were answered, patient will contact us for questions or concerns in the interim.             [1]   Current Outpatient Medications:     amLODIPine (NORVASC) 5 MG tablet, Take 1 tablet (5 mg total) by mouth every morning., Disp: 90 tablet, Rfl: 3    atorvastatin (LIPITOR) 10 MG tablet, Take 1 tablet (10 mg total) by mouth every evening., Disp: 90 tablet, Rfl: 3    blood-glucose  meter Misc, use as directed, Disp: , Rfl:     dapagliflozin propanediol (FARXIGA) 10 mg tablet, Take 1 tablet (10 mg total) by mouth once daily., Disp: 90 tablet, Rfl: 3    fenofibrate micronized (LOFIBRA) 67 MG capsule, Take 1 capsule (67 mg total) by mouth before breakfast., Disp: 90 capsule, Rfl: 3    fluocinolone (SYNALAR) 0.025 % cream, Apply topically once daily., Disp: 15 g, Rfl: 1    losartan (COZAAR) 50 MG tablet, Take 1 tablet (50 mg total) by mouth once daily., Disp: 90 tablet, Rfl: 3    meloxicam (MOBIC) 7.5 MG tablet, Take 1 tablet (7.5 mg total) by mouth once daily., Disp: 30 tablet, Rfl: 0    metFORMIN (GLUCOPHAGE) 1000 MG tablet, Take 1 tablet (1,000 mg total) by mouth 2 (two) times daily with meals., Disp: 180 tablet, Rfl: 3    metoprolol succinate (TOPROL-XL) 100 MG 24 hr tablet, Take 1 tablet (100 mg total) by mouth once daily., Disp: 90 tablet, Rfl: 3    paroxetine (PAXIL) 10 MG tablet, Take 1 tablet (10 mg total) by mouth once daily., Disp: 90 tablet, Rfl: 3    RSV, preF A and preF B,PF, (ABRYSVO, PF,) 120 mcg/0.5 mL SolR vaccine, Inject into the muscle., Disp: 0.5 mL, Rfl: 0

## 2025-04-21 ENCOUNTER — PATIENT OUTREACH (OUTPATIENT)
Dept: ADMINISTRATIVE | Facility: HOSPITAL | Age: 70
End: 2025-04-21
Payer: COMMERCIAL

## 2025-04-21 DIAGNOSIS — E11.9 TYPE 2 DIABETES MELLITUS WITHOUT RETINOPATHY: Primary | ICD-10-CM

## 2025-04-21 DIAGNOSIS — E11.69 HYPERLIPIDEMIA ASSOCIATED WITH TYPE 2 DIABETES MELLITUS: ICD-10-CM

## 2025-04-21 DIAGNOSIS — E78.5 HYPERLIPIDEMIA ASSOCIATED WITH TYPE 2 DIABETES MELLITUS: ICD-10-CM

## 2025-04-21 NOTE — PROGRESS NOTES
Chart reviewed and updated. Reconciled immunizations, health maintenance and care everywhere.  Placed A1c, Lip and microalbumin orders, attempting to contact pt by phone but lvm and sent portal msg.      La Mike [facility]  Panel Care Coordinator  [facility]  [phone]  For Luisa Zarate MD

## 2025-04-23 ENCOUNTER — CLINICAL SUPPORT (OUTPATIENT)
Dept: REHABILITATION | Facility: HOSPITAL | Age: 70
End: 2025-04-23
Payer: COMMERCIAL

## 2025-04-23 DIAGNOSIS — M25.612 DECREASED ROM OF LEFT SHOULDER: ICD-10-CM

## 2025-04-23 DIAGNOSIS — G89.29 CHRONIC LEFT SHOULDER PAIN: Primary | ICD-10-CM

## 2025-04-23 DIAGNOSIS — M25.512 CHRONIC LEFT SHOULDER PAIN: Primary | ICD-10-CM

## 2025-04-23 DIAGNOSIS — R29.3 ABNORMAL POSTURE: ICD-10-CM

## 2025-04-23 PROCEDURE — 97110 THERAPEUTIC EXERCISES: CPT

## 2025-04-23 NOTE — PROGRESS NOTES
[facility]   Physical Therapy Treatment Note      Name: Ciera Stallings MD  Clinic Number: [id number]    Therapy Diagnosis:   Encounter Diagnoses   Name Primary?    Chronic left shoulder pain Yes    Decreased ROM of left shoulder     Abnormal posture          Physician: Terrell Rouqe MD    Visit Date: 4/23/2025    Physician Orders: PT Eval and Treat   Medical Diagnosis from Referral:   M24.812 (ICD-10-CM) - Internal derangement of left shoulder   S40.012D (ICD-10-CM) - Contusion of left shoulder, subsequent encounter   S50.02XD (ICD-10-CM) - Contusion of left elbow, subsequent encounter   S43.402D (ICD-10-CM) - Sprain of left shoulder, unspecified shoulder sprain type, subsequent encounter   Evaluation Date: 1/9/2025  Authorization Period Expiration: 1/2/26  Plan of Care Expiration: Extend to 5/14/2025   Visit # / Visits authorized: 1/1, 10/12, 5/12     Foto  Date  Score    #1/3 1/9/2025 50   #2/3       #3/3             Precautions: Standard     PTA Visit #: 0/5     Time In: 800   Time Out: 855  Total Billable Time: 55 minutes    Subjective     Pt reports: she is feeling great. Patient feels 95% of normal. Patient notes that she is functionally independent and can do everything she wants to do without issue. Patient is going to continue her home program     She was compliant with home exercise program.  Response to previous treatment: no adverse affect  Functional change: none at this time     Pain: 3/10  Location: L shoulder     Objective      4/23/2025    Shoulder Right Right Right Left Left Left Pain/Dysfunction with Movement     AROM PROM MMT AROM PROM MMT     Flexion 140 160 4+/5 140  165  4+/5    Abduction 180 180 4+/5 180 180 4+/5      Internal rotation 70 75 4+/5 75 80 4+/5      External Rotation 90 90 4+/5 45  60 ! 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 Exercises were reviewed and Ciera was able to demonstrate them prior to the end of the session.  Ciera demonstrated good  understanding of the education provided. See EMR under Patient Instructions for exercises provided during therapy sessions    Assessment   Patient was reassessed today and has shown improvements since initial evaluation. Patient is functionally independent in her everyday job and daily activities. Patients foto score has shown improvement as well. Patient is going to discharge today with a home program. Patient will return if needed.       Ciera Is progressing well towards her goals.   Pt prognosis is Good.    Pt will continue to benefit from skilled outpatient physical therapy to address the deficits listed in the problem list box on initial evaluation, provide pt/family education and to maximize pt's level of independence in the home and community environment.     Pt's spiritual, cultural and educational needs considered and pt agreeable to plan of care and goals.     Anticipated barriers to physical therapy: none    Goals:   Short Term Goals (4 Weeks):   1. Pt will be independent with HEP to supplement PT in improving functional use of R UE. MET  2. Pt will increase pain free L shoulder flexion and  abduction PROM to at least >150 deg to improve functional mobility of UE MET  3. Pt will increase L shoulder ER / IR PROM to at least 90/80 deg to improve functional mobility of UE Partially MET  4. Pt will improve sitting posture without cueing from therapist  MET  Long Term Goals (8 Weeks):   1. Pt will improve FOTO score to >/=65 to decrease perceived limitation with carrying, moving, and handling objects. MET  2. Pt will increase L shoulder PROM to WNL in all planes to improve functional use of R RUE. MET  3. Pt will increase L shoulder AROM to WFL in all planes to improve functional use of R RUE. MET  4. Pt will improve L shoulder MMTs to = 4+/5 to promote equal use of B UEs in performing functional tasks. MET  5. Pt will lift 15 lb objects without pain to promote functional QOL.  MET  6. Pt to report pain </= 0/10 with ADLs and IADLs using L UE to improve functional QOL. MET     Plan     Discharge from PT       Stevo Holcomb, PT, DPT

## 2025-04-24 ENCOUNTER — CLINICAL SUPPORT (OUTPATIENT)
Dept: AUDIOLOGY | Facility: CLINIC | Age: 70
End: 2025-04-24
Payer: COMMERCIAL

## 2025-04-24 DIAGNOSIS — H90.3 SENSORINEURAL HEARING LOSS (SNHL), BILATERAL: Primary | ICD-10-CM

## 2025-04-24 NOTE — PROGRESS NOTES
4/24/2025    Hearing Aid Follow-up    Ciera Stallings MD was seen today for a hearing aid follow-up appointment to  a device remake. She is currently fit with the following devices:      Invoice Date: 02/15/2025   ImanOxThera AI 12 ITC R   LT SN: [id number]   RT SN: [id number]   Premium    SN: [id number]   Warranty Exp: 03/14/2028   Bundled    Action Taken:   Remake hearing aids and clean/check      Dr. Stallings remade hearing aids were connected to the [email or web address] software in [facility], new settings were prescribed, and the feedback manager was run. The physical fit of the hearing aids are good and Dr. Stallings reported good subjective benefit with the changes made today.    Recommendations:  Daily and consistent use of amplification  Hearing aid follow-up as needed  Change wax filters every 3 months (January, April, July, October)  Use alcohol wipe to clean dome, replace as needed  Annual audiologic evaluation or sooner if change perceived  Hearing protection in noise

## 2025-04-30 DIAGNOSIS — E78.5 HYPERLIPIDEMIA ASSOCIATED WITH TYPE 2 DIABETES MELLITUS: ICD-10-CM

## 2025-04-30 DIAGNOSIS — E11.69 HYPERLIPIDEMIA ASSOCIATED WITH TYPE 2 DIABETES MELLITUS: ICD-10-CM

## 2025-05-01 ENCOUNTER — CLINICAL SUPPORT (OUTPATIENT)
Dept: REHABILITATION | Facility: HOSPITAL | Age: 70
End: 2025-05-01
Payer: COMMERCIAL

## 2025-05-01 DIAGNOSIS — M67.912 ROTATOR CUFF DISORDER, LEFT: ICD-10-CM

## 2025-05-01 DIAGNOSIS — M25.512 LEFT SHOULDER PAIN, UNSPECIFIED CHRONICITY: Primary | ICD-10-CM

## 2025-05-01 DIAGNOSIS — M75.22 BICEPS TENDINITIS OF LEFT UPPER EXTREMITY: ICD-10-CM

## 2025-05-01 PROCEDURE — 97811 ACUP 1/> W/O ESTIM EA ADD 15: CPT | Mod: PN

## 2025-05-01 PROCEDURE — 97810 ACUP 1/> WO ESTIM 1ST 15 MIN: CPT | Mod: PN

## 2025-05-01 NOTE — PROGRESS NOTES
Acupuncture Evaluation Note     Name: Ciera Stallings MD  Clinic Number: [id number]    Traditional Chinese Medicine (TCM) Diagnosis: Qi Stagnation  Medical Diagnosis:   Encounter Diagnoses   Name Primary?    Left shoulder pain, unspecified chronicity     Rotator cuff disorder, left     Biceps tendinitis of left upper extremity         Evaluation Date: 5/1/2025    Visit #/Visits authorized: 1/ 12  (workers comp)     Precautions: Diabetes    Subjective     Chief Concern: work injury, fell from chair and hit left elbow and shoulder last year. Pain in upper arm muscle initially, and at onset could not use her arm.    Currently pain is now at 1-2. Now can use her arm but would like pain to go away completely. . Pain is not constant, only with certain movements. Experiences pain when reaching her arm backward and at full extension    PT did not help. A steroid injection reduced the pain.    Medical necessity is demonstrated by the following IMPAIRMENTS: Medical Necessity: Decreased quality of life                 Aggravating Factors:  movement   Relieving Factors:  nothing    Pain Description:     Quality:  Variable  Severity:  1-2  Frequency:  every day    Previous Treatments Tried:  Injection(s) and Therapy     HEENT:  headaches from certain triggers (lack of sleep, excessive heat) not frequent    Chest:  no issues shortness of breath/palpitations    Digestion:     Taste/Thirst/Appetite: occasional gas/bloating/acid reflux; no issues with diarrhea/constipation; no issues with weight fluctuations     Sleep:   okay, occasionally achiness from shoulder    Energy Levels:  good    Psychological Symptoms:  good support from family/friends; anxiety - managed with medication    Other Symptoms:     Cold/heat - tends to feel warm  Aversion to wind  - likes to have a fan blowing  Sweating  - sweats with caffeine and hunger; night sweats occasionally    GYN Symptoms:   postmenopausal    Objective     Observation: good  prakash    Tongue:  not assessed    Pulse:  not assessed    Treatment     Treatment Principles:  Regulate Qi; reduce pain    Needle Set 1 -     Acupuncture points used:     Bilateral:    Left:  LI15, TW14, Jaxon Lakeisha, LU3, LU4, LI11, LI10, SI3  Right:  ST36, BL62  Centerline:      Needles In: 10  Needles Out: 10  Needles W/O STIM placed:   9:20  Needles W/O STIM removed:   9:45    Other Traditional Chinese Medicine Modalities - Gwasha - red sha with light touch    Assessment     After treatment, patient felt  no change in pain, slightly increased range of motion. Ciera will see how she feels over the next few days.      Patient prognosis is Fair.     Patient will continue to benefit from acupuncture treatment to address the deficits listed in the problem list box on initial evaluation, provide patient family education and to maximize pt's level of independence in the home and community environment.     Patient's spiritual, cultural and educational needs considered and pt agreeable to plan of care and goals.       Anticipated barriers to treatment:   none    Plan     Recommend 1 /week for 6 sessions then re-assess.      Recommendations:  none at this time    Education:  Patient is aware of cumulative effect of acupuncture

## 2025-05-16 ENCOUNTER — CLINICAL SUPPORT (OUTPATIENT)
Dept: INTERNAL MEDICINE | Facility: CLINIC | Age: 70
End: 2025-05-16
Payer: COMMERCIAL

## 2025-05-16 ENCOUNTER — OFFICE VISIT (OUTPATIENT)
Dept: INTERNAL MEDICINE | Facility: CLINIC | Age: 70
End: 2025-05-16
Payer: COMMERCIAL

## 2025-05-16 ENCOUNTER — RESULTS FOLLOW-UP (OUTPATIENT)
Dept: INTERNAL MEDICINE | Facility: CLINIC | Age: 70
End: 2025-05-16

## 2025-05-16 VITALS
WEIGHT: 112.13 LBS | HEART RATE: 82 BPM | HEIGHT: 64 IN | SYSTOLIC BLOOD PRESSURE: 124 MMHG | DIASTOLIC BLOOD PRESSURE: 76 MMHG | BODY MASS INDEX: 19.14 KG/M2

## 2025-05-16 DIAGNOSIS — E11.9 TYPE 2 DIABETES MELLITUS WITHOUT RETINOPATHY: ICD-10-CM

## 2025-05-16 DIAGNOSIS — Z00.00 ROUTINE GENERAL MEDICAL EXAMINATION AT A HEALTH CARE FACILITY: Primary | ICD-10-CM

## 2025-05-16 DIAGNOSIS — H90.5 SENSORINEURAL HEARING LOSS (SNHL), UNSPECIFIED LATERALITY: ICD-10-CM

## 2025-05-16 DIAGNOSIS — Z00.00 ENCOUNTER FOR SCREENING AND PREVENTATIVE CARE: Primary | ICD-10-CM

## 2025-05-16 DIAGNOSIS — E03.8 SUBCLINICAL HYPOTHYROIDISM: ICD-10-CM

## 2025-05-16 LAB
ALBUMIN SERPL BCP-MCNC: 3.8 G/DL (ref 3.5–5.2)
ALBUMIN/CREAT UR: 7.8 UG/MG
ALP SERPL-CCNC: 57 UNIT/L (ref 40–150)
ALT SERPL W/O P-5'-P-CCNC: 17 UNIT/L (ref 10–44)
ANION GAP (OHS): 8 MMOL/L (ref 8–16)
AST SERPL-CCNC: 19 UNIT/L (ref 11–45)
BACTERIA #/AREA URNS AUTO: NORMAL /HPF
BILIRUB SERPL-MCNC: 0.4 MG/DL (ref 0.1–1)
BILIRUB UR QL STRIP.AUTO: NEGATIVE
BUN SERPL-MCNC: 11 MG/DL (ref 8–23)
CALCIUM SERPL-MCNC: 9.2 MG/DL (ref 8.7–10.5)
CHLORIDE SERPL-SCNC: 107 MMOL/L (ref 95–110)
CHOLEST SERPL-MCNC: 132 MG/DL (ref 120–199)
CHOLEST/HDLC SERPL: 2.3 {RATIO} (ref 2–5)
CLARITY UR: CLEAR
CO2 SERPL-SCNC: 29 MMOL/L (ref 23–29)
COLOR UR AUTO: YELLOW
CREAT SERPL-MCNC: 0.7 MG/DL (ref 0.5–1.4)
CREAT UR-MCNC: 103 MG/DL (ref 15–325)
EAG (OHS): 120 MG/DL (ref 68–131)
ERYTHROCYTE [DISTWIDTH] IN BLOOD BY AUTOMATED COUNT: 13.8 % (ref 11.5–14.5)
GFR SERPLBLD CREATININE-BSD FMLA CKD-EPI: >60 ML/MIN/1.73/M2
GLUCOSE SERPL-MCNC: 108 MG/DL (ref 70–110)
GLUCOSE UR QL STRIP: ABNORMAL
HBA1C MFR BLD: 5.8 % (ref 4–5.6)
HCT VFR BLD AUTO: 41.2 % (ref 37–48.5)
HCV AB SERPL QL IA: NORMAL
HDLC SERPL-MCNC: 57 MG/DL (ref 40–75)
HDLC SERPL: 43.2 % (ref 20–50)
HGB BLD-MCNC: 12.9 GM/DL (ref 12–16)
HGB UR QL STRIP: NEGATIVE
HOLD SPECIMEN: NORMAL
KETONES UR QL STRIP: NEGATIVE
LDLC SERPL CALC-MCNC: 53.4 MG/DL (ref 63–159)
LEUKOCYTE ESTERASE UR QL STRIP: NEGATIVE
MCH RBC QN AUTO: 27.5 PG (ref 27–31)
MCHC RBC AUTO-ENTMCNC: 31.3 G/DL (ref 32–36)
MCV RBC AUTO: 88 FL (ref 82–98)
MICROALBUMIN UR-MCNC: 8 UG/ML (ref ?–5000)
MICROSCOPIC COMMENT: NORMAL
NITRITE UR QL STRIP: NEGATIVE
NONHDLC SERPL-MCNC: 75 MG/DL
PH UR STRIP: 6 [PH]
PLATELET # BLD AUTO: 259 K/UL (ref 150–450)
PMV BLD AUTO: 10.6 FL (ref 9.2–12.9)
POTASSIUM SERPL-SCNC: 4.5 MMOL/L (ref 3.5–5.1)
PROT SERPL-MCNC: 6.8 GM/DL (ref 6–8.4)
PROT UR QL STRIP: NEGATIVE
RBC # BLD AUTO: 4.69 M/UL (ref 4–5.4)
RBC #/AREA URNS AUTO: <1 /HPF (ref 0–4)
SODIUM SERPL-SCNC: 144 MMOL/L (ref 136–145)
SP GR UR STRIP: 1.02
T4 FREE SERPL-MCNC: 1.04 NG/DL (ref 0.71–1.51)
TRIGL SERPL-MCNC: 108 MG/DL (ref 30–150)
TSH SERPL-ACNC: 4.56 UIU/ML (ref 0.4–4)
UROBILINOGEN UR STRIP-ACNC: NEGATIVE EU/DL
WBC # BLD AUTO: 6.73 K/UL (ref 3.9–12.7)
WBC #/AREA URNS AUTO: 1 /HPF (ref 0–5)
YEAST UR QL AUTO: NORMAL /HPF

## 2025-05-16 PROCEDURE — 83036 HEMOGLOBIN GLYCOSYLATED A1C: CPT

## 2025-05-16 PROCEDURE — 85027 COMPLETE CBC AUTOMATED: CPT

## 2025-05-16 PROCEDURE — 81003 URINALYSIS AUTO W/O SCOPE: CPT

## 2025-05-16 PROCEDURE — 82043 UR ALBUMIN QUANTITATIVE: CPT

## 2025-05-16 PROCEDURE — 86803 HEPATITIS C AB TEST: CPT

## 2025-05-16 PROCEDURE — 84443 ASSAY THYROID STIM HORMONE: CPT

## 2025-05-16 PROCEDURE — 82465 ASSAY BLD/SERUM CHOLESTEROL: CPT

## 2025-05-16 PROCEDURE — 82040 ASSAY OF SERUM ALBUMIN: CPT

## 2025-05-16 PROCEDURE — 99999 PR PBB SHADOW E&M-EST. PATIENT-LVL III: CPT | Mod: PBBFAC,,, | Performed by: EMERGENCY MEDICINE

## 2025-05-16 PROCEDURE — 99999 PR PBB SHADOW E&M-EST. PATIENT-LVL I: CPT | Mod: PBBFAC,,,

## 2025-05-16 PROCEDURE — 84439 ASSAY OF FREE THYROXINE: CPT

## 2025-05-16 NOTE — PROGRESS NOTES
[facility]      TODAY'S Date 5/16/2025  Patient ID: Ciera Stallings MD is a 69 y.o. female   MRN: [id number]  Primary Care Physician (PCP):  Luisa Zarate MD    SUBJECTIVE     Chief Complaint:   Chief Complaint   Patient presents with    [facility]      HISTORY OF PRESENT ILLNESS (HPI):   Reviewed medical, surgical, social and family history, medications, appropriate preventive health screenings, as well as vaccination history. Updates as noted below or in assessment and plan.    This is a very pleasant 69 y.o. nonsmoking female with PMHx HTN, MARQUITA, HLD, hypothyroidism, NIDDM. Although presenting for her usual annual exam in [facility], she is a new patient to me.  The patient is currently in good health.     DIABETES:  She reports fasting blood sugar never goes below 100 mg/dL and typically remains in the 120 mg/dL range, regardless of medication use. She maintains a vegetarian diet and avoids sweets. She reports limited exercise due to concerns about hypoglycemia, stating that increased physical activity requires additional food intake to prevent low blood sugar. Her current activity consists of walking during her workday, including commuting from the garage and moving within the building. She has access to a workplace gym but does not utilize it. She receives annual eye exams for diabetes monitoring.    MEDICATIONS:  Current medications include Metformin 1000 mg twice daily and Farxiga 10 mg in the evening. Previously tried Jardiance but discontinued due to frequent urinary tract infections and tolerable hypertrophy.    FAMILY HISTORY:  She has a family history of diabetes.    MEDICAL HISTORY:  She has a history of fall resulting in a shoulder injury requiring physical therapy. She has mild to moderate hearing loss requiring hearing aids. She reports recurrent angiomas on her skin.    IMAGING:  CT from November 2016 showed either a lipoma or an  angiomyolipoma on her kidney.    PREVENTIVE CARE:  She maintains regular dental visits every 6 months but declines colonoscopy, mammography, and DEXA scan.    SOCIAL HISTORY:  She works full time.      ROS:  General: -fever, -chills, -fatigue, -weight gain, -weight loss  Eyes: -vision changes, -redness, -discharge  ENT: -ear pain, -nasal congestion, -sore throat, +hearing loss, +difficulty hearing  Cardiovascular: -chest pain, -palpitations, -lower extremity edema  Respiratory: -cough, -shortness of breath  Gastrointestinal: -abdominal pain, -nausea, -vomiting, -diarrhea, -constipation, -blood in stool  Genitourinary: -dysuria, -hematuria, -frequency  Musculoskeletal: -joint pain, -muscle pain  Skin: -rash, +lesion, +skin redness  Neurological: -headache, -dizziness, -numbness, -tingling  Psychiatric: -anxiety, -depression, -sleep difficulty  Endocrine: +exercise intolerance       A review of medical records indicates patient has seen the following specialists:   Sports Medicine - Carter Mujica MD  Optometry - Charley Cortes OD      Immunization History   Administered Date(s) Administered    COVID-19, MRNA, LN-S, PF (Pfizer) (Purple Cap) 12/16/2020, 01/05/2021    COVID-19, mRNA, LNP-S, PF, isaiah-sucrose, 30 mcg/0.3 mL (Pfizer Ages 12+) 09/23/2024    Influenza - Quadrivalent 09/29/2016, 09/18/2017, 09/05/2018, 09/04/2019    Influenza - Trivalent - Fluad - Adjuvanted - PF (65 years and older 10/14/2022, 09/30/2024    Pneumococcal Conjugate - 20 Valent 06/07/2024    Rsv, Bivalent, Rsvpref (Abrysvo) 09/23/2024    Tdap 06/07/2024    Zoster Recombinant 07/27/2021, 09/29/2021     Past Medical History:   Diagnosis Date    Benign essential hypertension 01/16/2023    MARQUITA (generalized anxiety disorder) 01/16/2023    Hyperlipidemia associated with type 2 diabetes mellitus 06/06/2024    Subclinical hypothyroidism 01/16/2023    Type 2 diabetes mellitus without retinopathy 12/29/2013     Past Surgical History:  "  Procedure Laterality Date    APPENDECTOMY N/A     ELBOW FRACTURE SURGERY Right     HIP FRACTURE SURGERY Left      Family History   Problem Relation Name Age of Onset    Diabetes Mother jorge l     Hypertension Mother jorge l     Cancer Father AVS Tiana     Diabetes Sister majo,     Hearing loss Sister majo,     Diabetes Brother alberto kang      Social History[1]  Past Medical, Surgical and Social history reviewed and verified by me.     Review of patient's allergies indicates:  No Known Allergies  Medications Ordered Prior to Encounter[2]    OBJECTIVE     PHYSICAL EXAM  Vitals:    05/16/25 0836   BP: 124/76   BP Location: Left arm   Patient Position: Sitting   Pulse: 82   Weight: 50.8 kg (112 lb 1.7 oz)   Height: 5' 4" (1.626 m)     Vital Signs (Most Recent):  Pulse: 82 (05/16/25 0836)  BP: 124/76 (05/16/25 0836)   Weight:   Wt Readings from Last 1 Encounters:   05/16/25 50.8 kg (112 lb 1.7 oz)     Body mass index is 19.24 kg/m².  Physical Exam  Vitals (relatively normal vitals) and nursing note reviewed.         Physical Exam    General: No acute distress. Well-developed. Well-nourished.  Eyes: EOMI. Sclerae anicteric.  HENT: Normocephalic. Atraumatic. Nares patent. Moist oral mucosa.  Ears: Bilateral TMs clear. Bilateral EACs clear. Normal ear appearance.  Cardiovascular: Regular rate. Regular rhythm. No murmurs. No rubs. No gallops. Normal S1, S2.  Respiratory: Normal respiratory effort. Clear to auscultation bilaterally. No rales. No rhonchi. No wheezing.  Abdomen: Soft. Non-tender. Non-distended. Normoactive bowel sounds.  Musculoskeletal: No  obvious deformity. Normal musculoskeletal strength and coordination.  Extremities: No lower extremity edema.  Neurological: Alert & oriented x3. No slurred speech. Normal gait. Normal neurological function.  Psychiatric: Normal mood. Normal affect. Good insight. Good judgment.  Skin: Warm. Dry. No rash.         Tests    Cardiovascular Testing  No results found " for this or any previous visit.   No echocardiogram results found for the past 12 months   Labs reviewed and independently interpreted. Imaging studies reviewed.   Recent Results (from the past 12 weeks)   Comprehensive metabolic panel    Collection Time: 05/16/25  8:35 AM   Result Value Ref Range    Sodium 144 136 - 145 mmol/L    Potassium 4.5 3.5 - 5.1 mmol/L    Chloride 107 95 - 110 mmol/L    CO2 29 23 - 29 mmol/L    Glucose 108 70 - 110 mg/dL    BUN 11 8 - 23 mg/dL    Creatinine 0.7 0.5 - 1.4 mg/dL    Calcium 9.2 8.7 - 10.5 mg/dL    Protein Total 6.8 6.0 - 8.4 gm/dL    Albumin 3.8 3.5 - 5.2 g/dL    Bilirubin Total 0.4 0.1 - 1.0 mg/dL    ALP 57 40 - 150 unit/L    AST 19 11 - 45 unit/L    ALT 17 10 - 44 unit/L    Anion Gap 8 8 - 16 mmol/L    eGFR >60 >60 mL/min/1.73/m2   CBC Without Differential    Collection Time: 05/16/25  8:35 AM   Result Value Ref Range    WBC 6.73 3.90 - 12.70 K/uL    RBC 4.69 4.00 - 5.40 M/uL    HGB 12.9 12.0 - 16.0 gm/dL    HCT 41.2 37.0 - 48.5 %    MCV 88 82 - 98 fL    MCHC 31.3 (L) 32.0 - 36.0 g/dL    RDW 13.8 11.5 - 14.5 %    Platelet Count 259 150 - 450 K/uL    MCH 27.5 27.0 - 31.0 pg    MPV 10.6 9.2 - 12.9 fL   Lipid panel    Collection Time: 05/16/25  8:35 AM   Result Value Ref Range    Cholesterol Total 132 120 - 199 mg/dL    Triglyceride 108 30 - 150 mg/dL    HDL Cholesterol 57 40 - 75 mg/dL    LDL Cholesterol 53.4 (L) 63.0 - 159.0 mg/dL    HDL/Cholesterol Ratio 43.2 20.0 - 50.0 %    Cholesterol/HDL Ratio 2.3 2.0 - 5.0    Non HDL Cholesterol 75 mg/dL   TSH    Collection Time: 05/16/25  8:35 AM   Result Value Ref Range    TSH 4.562 (H) 0.400 - 4.000 uIU/mL   Hemoglobin A1c    Collection Time: 05/16/25  8:35 AM   Result Value Ref Range    Hemoglobin A1c 5.8 (H) 4.0 - 5.6 %    Estimated Average Glucose 120 68 - 131 mg/dL   Hepatitis C Antibody    Collection Time: 05/16/25  8:35 AM   Result Value Ref Range    Hep C Ab Interp Non-Reactive Non-Reactive   T4, Free    Collection Time:  05/16/25  8:35 AM   Result Value Ref Range    Free T4 1.04 0.71 - 1.51 ng/dL   Urinalysis, Reflex to Urine Culture Urine, Clean Catch    Collection Time: 05/16/25  9:30 AM    Specimen: Urine   Result Value Ref Range    Color, UA Yellow Straw, Laurence, Yellow, Light-Orange    Appearance, UA Clear Clear    pH, UA 6.0 5.0 - 8.0    Spec Grav UA 1.025 1.005 - 1.030    Protein, UA Negative Negative    Glucose, UA 4+ (A) Negative    Ketones, UA Negative Negative    Bilirubin, UA Negative Negative    Blood, UA Negative Negative    Nitrites, UA Negative Negative    Urobilinogen, UA Negative <2.0 EU/dL    Leukocyte Esterase, UA Negative Negative   Microalbumin/Creatinine Ratio, Urine    Collection Time: 05/16/25  9:30 AM   Result Value Ref Range    Urine Microalbumin 8.0   ug/mL    Urine Creatinine 103.0 15.0 - 325.0 mg/dL    Microalbumin/Creatinine Ratio Urine 7.8 <=30.0 ug/mg   GREY TOP URINE HOLD    Collection Time: 05/16/25  9:30 AM   Result Value Ref Range    Extra Tube Hold for add-ons.    Urinalysis Microscopic    Collection Time: 05/16/25  9:30 AM   Result Value Ref Range    RBC, UA <1 0 - 4 /HPF    WBC, UA 1 0 - 5 /HPF    Bacteria, UA None None, Rare, Occasional /HPF    Yeast, UA None None /HPF    Microscopic Comment         Latest Reference Range & Units 01/16/23 09:27 04/19/24 09:18 05/16/25 08:35   TSH 0.400 - 4.000 uIU/mL 5.360 (H) (E) 5.090 (H) (E) 4.562 (H)   T4, Free 0.82 - 1.77 ng/dL  1.18 (E)    Free T4 0.71 - 1.51 ng/dL   1.04     XR SHOULDER COMPLETE 2 OR MORE VIEWS LEFT  Feb 2025 Impression: No acute abnormality.    MRI SHOULDER WITHOUT CONTRAST LEFT Jan 2025 Impression:  No evidence for occult osseous injury or rotator cuff tear.   Age-indeterminate irregularity of the anterior inferior labrum with labral capsular irregularity posterior labrum mid equator level.  As above.     MAMMO DIGITAL SCREENING BILAT 2018 Impression  IMPRESSION: No mammographic evidence of malignancy in either breast.    CT ABDOMEN  PELVIS W WO CONTRAST Nov 2016 IMPRESSION:  No suspicious renal or upper urothelial lesions identified. No hydronephrosis or renal stones.  Stable 6 - 7 mm angiomyolipoma in the upper pole of the right kidney.  Mucosal hyperenhancement of the urinary bladder and vagina may be related to inflammatory change. No filling defects or transmural bladder lesions identified.      ASSESSMENT/PLAN:   MDM  Reviewed: previous chart, nursing note and vitals  Reviewed previous: labs, ultrasound, CT scan and x-ray  Interpretation: labs     Glucosuria without proteinuria, Elevated hemoglobin A1c and TSH with normal T4 and low HDL  otherwise relatively unremarkable Lipid Panel, CMP, CBC, TSH, HgA1C, HIV, Hep C ab    Health Maintenance   Topic Date Due    Foot Exam  Never done    DEXA Scan  Never done    Hemoglobin A1c  11/16/2025    Diabetic Eye Exam  04/09/2026    Low Dose Statin  04/10/2026    Diabetes Urine Screening  05/16/2026    Lipid Panel  05/16/2026    TETANUS VACCINE  06/07/2034    Hepatitis C Screening  Completed    Shingles Vaccine  Completed    Influenza Vaccine  Completed    COVID-19 Vaccine  Completed    RSV Vaccine (Age 60+ and Pregnant patients)  Completed    Pneumococcal Vaccines (Age 50+)  Completed    Colorectal Cancer Screening  Discontinued    Mammogram  Discontinued     Ciera was seen today for [facility].    Diagnoses and all orders for this visit:    Encounter for screening and preventative care    Type 2 diabetes mellitus without retinopathy  -     Microalbumin/creatinine urine ratio  -     Urinalysis, Reflex to Urine Culture Urine, Clean Catch; Future    Subclinical hypothyroidism    Sensorineural hearing loss (SNHL), unspecified laterality     Assessment & Plan        Encounter for Screening and Preventative Care  Annual preventive visit completed.  Discussed the importance of continued health maintenance, including routine screenings and lifestyle optimization.  Reviewed recent labs with no urgent  concerns noted.  Encouraged healthy lifestyle strategies:  Balanced diet including 1,200 mg of elemental calcium daily (diet + supplements)  2,000 IU of vitamin D daily  Weight-bearing exercise, flexibility/agility training (e.g., yoga), and weight management  Sun protection, hearing safety, and alcohol moderation    Type 2 Diabetes Mellitus Without Retinopathy  Diabetes currently managed with metformin and Farxiga, both well tolerated.  Discussed additional options such as GLP-1 agonists (e.g., semaglutide, tirzepatide); patient declined injectable medications at this time.  Ordered the following for continued monitoring:  Microalbumin/creatinine urine ratio  Urinalysis, reflex to urine culture (clean-catch collection)    Subclinical Hypothyroidism  If asymptomatic, continue monitoring TSH and Free T4 every 6-12 months.  Consider TPO antibody testing at the next lab interval to assess for autoimmune thyroiditis.    Sensorineural Hearing Loss (SNHL), Unspecified Laterality  Screening for hearing concerns completed during preventive visit; no acute issues noted.  Continue routine audiologic follow-up as indicated.    The results of physical exam findings, labs, and imaging were reviewed with the patient. Management of above assessments/visit diagnoses was discussed with patient. Precautions for return discussed at length. Patient was given ample time for questions. All questions asked and answered to the satisfaction of the patient. Patient is in agreement with the above and verbalized understanding. Total time spent caring for the patient today was 30 minutes. This includes time spent before the visit reviewing the chart, time spent during the visit, and time spent after the visit on documentation.    Audie Haynes MD  [facility]    Disclaimer: This document was created using voice recognition software (M*Modal Fluency Direct). Although it may be edited, this document  may contain errors related to incorrect recognition of the spoken word. Please contact the physician if clarification is needed.           [1]   Social History  Tobacco Use    Smoking status: Never    Smokeless tobacco: Never   Substance Use Topics    Alcohol use: Never    Drug use: Never   [2]   Current Outpatient Medications on File Prior to Visit   Medication Sig Dispense Refill    amLODIPine (NORVASC) 5 MG tablet Take 1 tablet (5 mg total) by mouth every morning. 90 tablet 3    atorvastatin (LIPITOR) 10 MG tablet Take 1 tablet (10 mg total) by mouth every evening. 90 tablet 3    blood-glucose meter Misc use as directed      dapagliflozin propanediol (FARXIGA) 10 mg tablet Take 1 tablet (10 mg total) by mouth once daily. 90 tablet 3    fenofibrate micronized (LOFIBRA) 67 MG capsule Take 1 capsule (67 mg total) by mouth before breakfast. 90 capsule 3    fluocinolone (SYNALAR) 0.025 % cream Apply topically once daily. 15 g 1    losartan (COZAAR) 50 MG tablet Take 1 tablet (50 mg total) by mouth once daily. 90 tablet 3    meloxicam (MOBIC) 7.5 MG tablet Take 1 tablet (7.5 mg total) by mouth once daily. 30 tablet 0    metFORMIN (GLUCOPHAGE) 1000 MG tablet Take 1 tablet (1,000 mg total) by mouth 2 (two) times daily with meals. 180 tablet 3    metoprolol succinate (TOPROL-XL) 100 MG 24 hr tablet Take 1 tablet (100 mg total) by mouth once daily. 90 tablet 3    paroxetine (PAXIL) 10 MG tablet Take 1 tablet (10 mg total) by mouth once daily. 90 tablet 3    RSV, preF A and preF B,PF, (ABRYSVO, PF,) 120 mcg/0.5 mL SolR vaccine Inject into the muscle. 0.5 mL 0     No current facility-administered medications on file prior to visit.

## 2025-05-20 DIAGNOSIS — I10 BENIGN ESSENTIAL HYPERTENSION: ICD-10-CM

## 2025-05-20 DIAGNOSIS — E11.9 TYPE 2 DIABETES MELLITUS WITHOUT RETINOPATHY: ICD-10-CM

## 2025-05-20 DIAGNOSIS — E78.5 HYPERLIPIDEMIA ASSOCIATED WITH TYPE 2 DIABETES MELLITUS: ICD-10-CM

## 2025-05-20 DIAGNOSIS — E11.69 HYPERLIPIDEMIA ASSOCIATED WITH TYPE 2 DIABETES MELLITUS: ICD-10-CM

## 2025-05-20 DIAGNOSIS — F41.1 GAD (GENERALIZED ANXIETY DISORDER): ICD-10-CM

## 2025-05-20 RX ORDER — METFORMIN HYDROCHLORIDE 1000 MG/1
1000 TABLET ORAL 2 TIMES DAILY WITH MEALS
Qty: 180 TABLET | Refills: 3 | Status: SHIPPED | OUTPATIENT
Start: 2025-05-20 | End: 2026-05-15

## 2025-05-20 RX ORDER — LOSARTAN POTASSIUM 50 MG/1
50 TABLET ORAL DAILY
Qty: 90 TABLET | Refills: 3 | Status: SHIPPED | OUTPATIENT
Start: 2025-05-20 | End: 2026-05-15

## 2025-05-20 RX ORDER — FENOFIBRATE 67 MG/1
67 CAPSULE ORAL
Qty: 90 CAPSULE | Refills: 3 | Status: SHIPPED | OUTPATIENT
Start: 2025-05-20 | End: 2026-05-15

## 2025-05-20 RX ORDER — ATORVASTATIN CALCIUM 10 MG/1
10 TABLET, FILM COATED ORAL NIGHTLY
Qty: 90 TABLET | Refills: 3 | Status: SHIPPED | OUTPATIENT
Start: 2025-05-20 | End: 2026-05-20

## 2025-05-20 RX ORDER — PAROXETINE 10 MG/1
10 TABLET, FILM COATED ORAL DAILY
Qty: 90 TABLET | Refills: 3 | Status: SHIPPED | OUTPATIENT
Start: 2025-05-20 | End: 2026-05-15

## 2025-05-20 RX ORDER — AMLODIPINE BESYLATE 5 MG/1
5 TABLET ORAL EVERY MORNING
Qty: 90 TABLET | Refills: 3 | Status: SHIPPED | OUTPATIENT
Start: 2025-05-20 | End: 2026-05-20

## 2025-05-20 RX ORDER — DAPAGLIFLOZIN 10 MG/1
10 TABLET, FILM COATED ORAL DAILY
Qty: 90 TABLET | Refills: 3 | Status: SHIPPED | OUTPATIENT
Start: 2025-05-20 | End: 2026-05-15

## 2025-05-20 RX ORDER — METOPROLOL SUCCINATE 100 MG/1
100 TABLET, EXTENDED RELEASE ORAL DAILY
Qty: 90 TABLET | Refills: 3 | Status: SHIPPED | OUTPATIENT
Start: 2025-05-20 | End: 2026-05-15

## 2025-05-20 NOTE — TELEPHONE ENCOUNTER
No care due was identified.  Health [facility] Embedded Care Due Messages. Reference number: [id number].   5/20/2025 3:06:12 PM CDT

## 2025-06-25 DIAGNOSIS — Z78.0 MENOPAUSE: ICD-10-CM

## 2025-07-29 ENCOUNTER — OFFICE VISIT (OUTPATIENT)
Dept: PODIATRY | Facility: CLINIC | Age: 70
End: 2025-07-29
Payer: COMMERCIAL

## 2025-07-29 VITALS
BODY MASS INDEX: 19.64 KG/M2 | HEART RATE: 78 BPM | DIASTOLIC BLOOD PRESSURE: 70 MMHG | SYSTOLIC BLOOD PRESSURE: 111 MMHG | HEIGHT: 64 IN | WEIGHT: 115.06 LBS

## 2025-07-29 DIAGNOSIS — L60.0 INGROWN NAIL: ICD-10-CM

## 2025-07-29 DIAGNOSIS — M79.672 LEFT FOOT PAIN: Primary | ICD-10-CM

## 2025-07-29 DIAGNOSIS — S90.112S CONTUSION OF LEFT GREAT TOE WITHOUT DAMAGE TO NAIL, SEQUELA: ICD-10-CM

## 2025-07-29 PROCEDURE — 99203 OFFICE O/P NEW LOW 30 MIN: CPT | Mod: S$GLB,,, | Performed by: PODIATRIST

## 2025-07-29 PROCEDURE — 3008F BODY MASS INDEX DOCD: CPT | Mod: CPTII,S$GLB,, | Performed by: PODIATRIST

## 2025-07-29 PROCEDURE — 3074F SYST BP LT 130 MM HG: CPT | Mod: CPTII,S$GLB,, | Performed by: PODIATRIST

## 2025-07-29 PROCEDURE — 1125F AMNT PAIN NOTED PAIN PRSNT: CPT | Mod: CPTII,S$GLB,, | Performed by: PODIATRIST

## 2025-07-29 PROCEDURE — 3044F HG A1C LEVEL LT 7.0%: CPT | Mod: CPTII,S$GLB,, | Performed by: PODIATRIST

## 2025-07-29 PROCEDURE — 3066F NEPHROPATHY DOC TX: CPT | Mod: CPTII,S$GLB,, | Performed by: PODIATRIST

## 2025-07-29 PROCEDURE — 99999 PR PBB SHADOW E&M-EST. PATIENT-LVL III: CPT | Mod: PBBFAC,,, | Performed by: PODIATRIST

## 2025-07-29 PROCEDURE — 3078F DIAST BP <80 MM HG: CPT | Mod: CPTII,S$GLB,, | Performed by: PODIATRIST

## 2025-07-29 PROCEDURE — 3061F NEG MICROALBUMINURIA REV: CPT | Mod: CPTII,S$GLB,, | Performed by: PODIATRIST

## 2025-07-29 PROCEDURE — 4010F ACE/ARB THERAPY RXD/TAKEN: CPT | Mod: CPTII,S$GLB,, | Performed by: PODIATRIST

## 2025-07-30 NOTE — PROGRESS NOTES
Subjective:      Patient ID: Ciera MD Chandrakant is a 69 y.o. female.    Chief Complaint:   Nail Problem (Left great toe) and Diabetes Mellitus (PCP- 05/16/2025/Audie Haynes MD)    Ciera is a 69 y.o. female who presents to the clinic complaining of painful ingrown toenail on the left foot.    Review of Systems   Constitutional: Negative for chills, decreased appetite, fever and malaise/fatigue.   HENT:  Negative for congestion, hearing loss, nosebleeds and tinnitus.    Eyes:  Negative for double vision, pain, photophobia and visual disturbance.   Cardiovascular:  Negative for chest pain, claudication, cyanosis and leg swelling.   Respiratory:  Negative for cough, hemoptysis, shortness of breath and wheezing.    Endocrine: Negative for cold intolerance and heat intolerance.   Hematologic/Lymphatic: Negative for adenopathy and bleeding problem.   Skin:  Positive for color change and nail changes. Negative for dry skin, itching and suspicious lesions.   Musculoskeletal:  Positive for arthritis. Negative for joint pain, myalgias and stiffness.   Gastrointestinal:  Negative for abdominal pain, jaundice, nausea and vomiting.   Genitourinary:  Negative for dysuria, frequency and hematuria.   Neurological:  Negative for difficulty with concentration, loss of balance, numbness, paresthesias and sensory change.   Psychiatric/Behavioral:  Negative for altered mental status, hallucinations and suicidal ideas. The patient is not nervous/anxious.    Allergic/Immunologic: Negative for environmental allergies and persistent infections.         Objective:      Physical Exam  Vitals reviewed.   Constitutional:       Appearance: She is well-developed.   HENT:      Head: Normocephalic and atraumatic.   Cardiovascular:      Pulses:           Dorsalis pedis pulses are 2+ on the right side and 2+ on the left side.        Posterior tibial pulses are 2+ on the right side and 2+ on the left side.   Pulmonary:      Effort: Pulmonary  effort is normal.   Musculoskeletal:         General: Normal range of motion.      Comments: Inspection and palpation of the muscles joints and bones of both lower extremities reveal that muscle strength for the anterior lateral and posterior muscle groups and intrinsic muscle groups of the foot are all 5 over 5 symmetrical.  Ankle subtalar midtarsal and digital joint range of motion are within normal limits, nonpainful, without crepitus or effusion.  Patient exhibits a normal angle and base of gait.  Palpation of the tendons reveal no defects.   Skin:     General: Skin is warm and dry.      Capillary Refill: Capillary refill takes 2 to 3 seconds.      Comments: Skin turgor is normal bilaterally.  Skin texture is well hydrated to both lower extremities.      Inflamed cryptotic border of medial border left hallux nail with no drainage, pus nor malodor but mild erythema and edema of the associated nail fold.  Mild edema/erythema left hallux.   Neurological:      Mental Status: She is alert and oriented to person, place, and time.      Comments: Sharp dull light touch vibratory proprioceptive sensation are intact bilaterally.  Deep tendon reflexes to patellar and Achilles tendon are symmetrical 2 over 4 bilaterally.  No ankle clonus or Babinski reflexes noted bilaterally.  Coordination is normal to both feet and lower extremities.   Psychiatric:         Behavior: Behavior normal.           Assessment:       Encounter Diagnoses   Name Primary?    Left foot pain Yes    Ingrown nail - Left Foot     Contusion of left great toe without damage to nail, sequela      Independent visualization of imaging was performed.  Results were reviewed in detail with patient.       Plan:       Ciera was seen today for nail problem and diabetes mellitus.    Diagnoses and all orders for this visit:    Left foot pain    Ingrown nail - Left Foot    Contusion of left great toe without damage to nail, sequela      I counseled the patient on  her conditions, their implications and medical management.    Ongoing monitoring, evaluating, assessing, and treatment options are recognized and reviewed in detail with the patient.    The nature of the condition, options for management, as well as potential risks and complications were discussed in detail with patient. Patient was amenable to my recommendations and left my office fully informed and will follow up as instructed or sooner if necessary.      Conservative surgical options discussed in detail.  Begin ice/topical anti-inflammatory gel and oral anti-inflammatories as needed.  Buddy splinting as an option.  In addition will likely need a medial partial nail avulsion of the left hallux nail.  She will follow-up to discuss this further she did not want to pursue this today.